# Patient Record
Sex: FEMALE | Race: OTHER
[De-identification: names, ages, dates, MRNs, and addresses within clinical notes are randomized per-mention and may not be internally consistent; named-entity substitution may affect disease eponyms.]

---

## 2017-04-20 ENCOUNTER — HOSPITAL ENCOUNTER (EMERGENCY)
Dept: HOSPITAL 63 - ER | Age: 27
Discharge: HOME | End: 2017-04-20
Payer: OTHER GOVERNMENT

## 2017-04-20 VITALS — BODY MASS INDEX: 21.97 KG/M2 | HEIGHT: 67 IN | WEIGHT: 140 LBS

## 2017-04-20 VITALS — SYSTOLIC BLOOD PRESSURE: 101 MMHG | DIASTOLIC BLOOD PRESSURE: 58 MMHG

## 2017-04-20 DIAGNOSIS — E87.6: ICD-10-CM

## 2017-04-20 DIAGNOSIS — Z86.2: ICD-10-CM

## 2017-04-20 DIAGNOSIS — K50.90: Primary | ICD-10-CM

## 2017-04-20 LAB
ALBUMIN SERPL-MCNC: 3.9 G/DL (ref 3.4–5)
ALBUMIN/GLOB SERPL: 1 {RATIO} (ref 1–1.7)
ALP SERPL-CCNC: 58 U/L (ref 46–116)
ALT SERPL-CCNC: 18 U/L (ref 14–59)
AMORPH SED URNS QL MICRO: PRESENT /HPF
AMPHETAMINE/METHAMPHETAMINE: (no result)
ANION GAP SERPL CALC-SCNC: 9 MMOL/L (ref 6–14)
APTT PPP: (no result) S
AST SERPL-CCNC: 19 U/L (ref 15–37)
BACTERIA #/AREA URNS HPF: (no result) /HPF
BARBITURATES UR-MCNC: (no result) UG/ML
BASOPHILS # BLD AUTO: 0.1 X10^3/UL (ref 0–0.2)
BASOPHILS NFR BLD: 1 % (ref 0–3)
BENZODIAZ UR-MCNC: (no result) UG/L
BILIRUB SERPL-MCNC: 0.3 MG/DL (ref 0.2–1)
BILIRUB UR QL STRIP: (no result)
BUN/CREAT SERPL: 12 (ref 6–20)
CA-I SERPL ISE-MCNC: 7 MG/DL (ref 7–20)
CALCIUM SERPL-MCNC: 8.9 MG/DL (ref 8.5–10.1)
CANNABINOIDS UR-MCNC: (no result) UG/L
CHLORIDE SERPL-SCNC: 103 MMOL/L (ref 98–107)
CO2 SERPL-SCNC: 29 MMOL/L (ref 21–32)
COCAINE UR-MCNC: (no result) NG/ML
CREAT SERPL-MCNC: 0.6 MG/DL (ref 0.6–1)
EOSINOPHIL NFR BLD: 0.2 X10^3/UL (ref 0–0.7)
EOSINOPHIL NFR BLD: 2 % (ref 0–3)
ERYTHROCYTE [DISTWIDTH] IN BLOOD BY AUTOMATED COUNT: 13.9 % (ref 11.5–14.5)
FIBRINOGEN PPP-MCNC: (no result) MG/DL
GFR SERPLBLD BASED ON 1.73 SQ M-ARVRAT: 119 ML/MIN
GLOBULIN SER-MCNC: 4 G/DL (ref 2.2–3.8)
GLUCOSE SERPL-MCNC: 83 MG/DL (ref 70–99)
GLUCOSE UR STRIP-MCNC: (no result) MG/DL
HCT VFR BLD CALC: 39.7 % (ref 36–47)
HGB BLD-MCNC: 12.7 G/DL (ref 12–15.5)
LIPASE: 63 U/L (ref 73–393)
LYMPHOCYTES # BLD: 1.8 X10^3/UL (ref 1–4.8)
LYMPHOCYTES NFR BLD AUTO: 24 % (ref 24–48)
MCH RBC QN AUTO: 27 PG (ref 25–35)
MCHC RBC AUTO-ENTMCNC: 32 G/DL (ref 31–37)
MCV RBC AUTO: 83 FL (ref 79–100)
METHADONE SERPL-MCNC: (no result) NG/ML
MONO #: 0.6 X10^3/UL (ref 0–1.1)
MONOCYTES NFR BLD: 8 % (ref 0–9)
NEUT #: 4.9 X10^3UL (ref 1.8–7.7)
NEUTROPHILS NFR BLD AUTO: 65 % (ref 31–73)
NITRITE UR QL STRIP: (no result)
OPIATES UR-MCNC: (no result) NG/ML
PCP SERPL-MCNC: (no result) MG/DL
PLATELET # BLD AUTO: 191 X10^3/UL (ref 140–400)
POTASSIUM SERPL-SCNC: 3.4 MMOL/L (ref 3.5–5.1)
PROT SERPL-MCNC: 7.9 G/DL (ref 6.4–8.2)
RBC # BLD AUTO: 4.76 X10^6/UL (ref 3.5–5.4)
RBC #/AREA URNS HPF: 0 /HPF (ref 0–2)
SODIUM SERPL-SCNC: 141 MMOL/L (ref 136–145)
SP GR UR STRIP: 1.02
SPERM #/AREA URNS HPF: PRESENT /HPF
SQUAMOUS #/AREA URNS LPF: (no result) /LPF
UROBILINOGEN UR-MCNC: 0.2 MG/DL
WBC # BLD AUTO: 7.5 X10^3/UL (ref 4–11)
WBC #/AREA URNS HPF: (no result) /HPF (ref 0–4)

## 2017-04-20 PROCEDURE — 96372 THER/PROPH/DIAG INJ SC/IM: CPT

## 2017-04-20 PROCEDURE — 80053 COMPREHEN METABOLIC PANEL: CPT

## 2017-04-20 PROCEDURE — 80305 DRUG TEST PRSMV DIR OPT OBS: CPT

## 2017-04-20 PROCEDURE — 83690 ASSAY OF LIPASE: CPT

## 2017-04-20 PROCEDURE — 36415 COLL VENOUS BLD VENIPUNCTURE: CPT

## 2017-04-20 PROCEDURE — 84703 CHORIONIC GONADOTROPIN ASSAY: CPT

## 2017-04-20 PROCEDURE — 74022 RADEX COMPL AQT ABD SERIES: CPT

## 2017-04-20 PROCEDURE — 96375 TX/PRO/DX INJ NEW DRUG ADDON: CPT

## 2017-04-20 PROCEDURE — 85027 COMPLETE CBC AUTOMATED: CPT

## 2017-04-20 PROCEDURE — 81025 URINE PREGNANCY TEST: CPT

## 2017-04-20 PROCEDURE — 96361 HYDRATE IV INFUSION ADD-ON: CPT

## 2017-04-20 PROCEDURE — 99285 EMERGENCY DEPT VISIT HI MDM: CPT

## 2017-04-20 PROCEDURE — 96374 THER/PROPH/DIAG INJ IV PUSH: CPT

## 2017-04-20 PROCEDURE — 81001 URINALYSIS AUTO W/SCOPE: CPT

## 2017-04-20 NOTE — RAD
Acute abdomen series with chest, 3 views, 4/20/2017:



History: Abdominal pain



Gas is present scattered throughout the colon in a nonspecific pattern. No

free air seen in the abdomen. There is no evidence of organomegaly or abnormal

abdominal calcification. Bilateral fallopian tube occlusion devices are noted.



The heart size is normal. The lungs are clear. There is no evidence of pleural

fluid.



IMPRESSION: No acute abdominal abnormality is detected.

## 2017-04-20 NOTE — PHYS DOC
General


Chief Complaint:  ABDOMINAL PAIN


Stated Complaint:  Crohn's exacerbation


Time Seen by MD:  10:31


Source:  patient


Exam Limitations:  no limitations


Problems:  





History of Present Illness


Initial Comments


Pt is 26/F h/o Crohn's to ED c/o abdominal pain.


Pt states she's having Crohn's exacerbation, hasn't had one in 3 years.  States 

that this am while working as  sudden onset abdominal pain.  She says 

pain was so strong it knocked her into the pool.  She dried off and drove 

herself to ED.


Abdominal pain low abdomen/epigastric 8.5 cramping identical to prior sx.  

Loose watery stool this am she thinks may have had slight blood.  Typically 

takes steroids/pain meds with her exacerbations.  No travel/bad food exposure, 

no fever/chills/cp/sob/n/v/HA/joint pain.





ED VS:  98.3, 75, 16, 114/70, 98% RA


Timing/Duration:  1-3 hours


Severity:  severe


Modifying Factors:  improves with medication


Associated Symptoms:  other


Allergies:  


Coded Allergies:  


     No Known Drug Allergies (Unverified , 17)





Past Medical History


Medical History:  other (Crohn/s, anemia, anxiety, bipolar, hashimoto's)


Surgical History:  other (Exp Lap)





Social History


Smoker:  non-smoker


Alcohol:  none


Drugs:  none





Review of Systems


Constitutional:  denies chills, denies fever


Respiratory:  denies cough, denies shortness of breath


Cardiovascular:  denies chest pain, denies palpitations


Gastrointestinal:   see HPI


Genitourinary:  denies dysuria, denies frequency


Musculoskeletal:  denies back pain, denies joint swelling, denies neck pain


Psychiatric/Neurological:  denies headache, denies numbness, denies paresthesia





Physical Exam


General Appearance:  WD/WN, mild distress


Eyes:  bilateral eye EOMI, bilateral eye PERRL, bilateral eye normal inspection


Ear, Nose, Throat:  hearing grossly normal, normal ENT inspection, normal 

pharynx


Neck:  non-tender, supple


Respiratory:  normal breath sounds, no respiratory distress


Cardiovascular:  normal peripheral pulses, regular rate, rhythm


Gastrointestinal:  normal bowel sounds, soft (ND, generalized TTP no focality 

no r/g/mass), no organomegaly


Rectal:  deferred


Back:  no CVA tenderness, no vertebral tenderness


Extremities:  non-tender, normal inspection


Neurologic/Psychiatric:  CNs II-XII nml as tested, no motor/sensory deficits, 

alert, normal mood/affect, oriented x 3


Skin:  normal color, warm/dry





Orders, Labs, Meds





PATIENT: FRANCESCA MCBRIDE ACCOUNT: [id number] MRN#: [id number]


: 1990 LOCATION: ER AGE: 26


SEX: F EXAM DT: 17 ACCESSION#: [id number]


STATUS: PRE ER ORD. PHYSICIAN: PRETTY MORA DO 


REASON: abd pain


PROCEDURE: ACUTE ABDOMEN SERIES











Acute abdomen series with chest, 3 views, 2017:





History: Abdominal pain





Gas is present scattered throughout the colon in a nonspecific pattern. No


free air seen in the abdomen. There is no evidence of organomegaly or abnormal


abdominal calcification. Bilateral fallopian tube occlusion devices are noted.





The heart size is normal. The lungs are clear. There is no evidence of pleural


fluid.





IMPRESSION: No acute abdominal abnormality is detected.














DICTATED AND SIGNED BY:     MORITZ,RICK S MD


DATE:     17 1139





CC: PRETTY MORA DO ~








K+ slightly low PO replacement given.  Pt feeling much better.  States she is 

ready for discharge, requests contact information for local GI as it has been 3 

years since her last GI evaluation/scopes.  Pt is aware she must obtain 

referral thru her PCP/radha.  She expressed agreement/understanding with 

treatment plan.


Departure


Time of Disposition:  12:22


Disposition:  01 HOME, SELF-CARE


Diagnosis:  Crohn's exacerbation, hypokalemia


Condition:  GOOD


Patient Instructions:  Crohn's Disease, Hypokalemia-Brief





Additional Instructions:


Work excuse thru  if needed.


Rest, no strenuous activity.


Aggressive hydration with gatorade, water.


Rx:  norco 5mg #15, prednisone, dicyclomine, cipro, flagyl


Take meds with food.


No alcohol while taking flagyl to avoid severe nausea and vomitting.





Follow up with your doctor Monday; may need outpatient GI referral.


Per your request I include contact information for our local gastroenterologist:


Bennett MEHTA MD


[facility]


[address]


[place] [address]





Eat one banana twice daily until doctor follow up.


Return to ED with new or changing symptoms.








PRETTY MORA DO 2017 11:48

## 2017-05-30 ENCOUNTER — HOSPITAL ENCOUNTER (EMERGENCY)
Dept: HOSPITAL 63 - ER | Age: 27
Discharge: HOME | End: 2017-05-30
Payer: OTHER GOVERNMENT

## 2017-05-30 VITALS — DIASTOLIC BLOOD PRESSURE: 92 MMHG | SYSTOLIC BLOOD PRESSURE: 134 MMHG

## 2017-05-30 DIAGNOSIS — R10.13: Primary | ICD-10-CM

## 2017-05-30 DIAGNOSIS — R11.2: ICD-10-CM

## 2017-05-30 DIAGNOSIS — E03.9: ICD-10-CM

## 2017-05-30 LAB
ALBUMIN SERPL-MCNC: 3.8 G/DL (ref 3.4–5)
ALP SERPL-CCNC: 59 U/L (ref 46–116)
ALT SERPL-CCNC: 12 U/L (ref 14–59)
ANION GAP SERPL CALC-SCNC: 9 MMOL/L (ref 6–14)
APTT PPP: (no result) S
AST SERPL-CCNC: 17 U/L (ref 15–37)
BACTERIA #/AREA URNS HPF: 0 /HPF
BASOPHILS # BLD AUTO: 0.1 X10^3/UL (ref 0–0.2)
BASOPHILS NFR BLD: 1 % (ref 0–3)
BILIRUB DIRECT SERPL-MCNC: 0.1 MG/DL (ref 0–0.2)
BILIRUB SERPL-MCNC: 0.3 MG/DL (ref 0.2–1)
BILIRUB UR QL STRIP: (no result)
CA-I SERPL ISE-MCNC: 8 MG/DL (ref 7–20)
CALCIUM SERPL-MCNC: 8.4 MG/DL (ref 8.5–10.1)
CHLORIDE SERPL-SCNC: 105 MMOL/L (ref 98–107)
CO2 SERPL-SCNC: 27 MMOL/L (ref 21–32)
CREAT SERPL-MCNC: 0.6 MG/DL (ref 0.6–1)
EOSINOPHIL NFR BLD: 0.2 X10^3/UL (ref 0–0.7)
EOSINOPHIL NFR BLD: 3 % (ref 0–3)
ERYTHROCYTE [DISTWIDTH] IN BLOOD BY AUTOMATED COUNT: 14.8 % (ref 11.5–14.5)
FIBRINOGEN PPP-MCNC: (no result) MG/DL
GFR SERPLBLD BASED ON 1.73 SQ M-ARVRAT: 119 ML/MIN
GLUCOSE SERPL-MCNC: 85 MG/DL (ref 70–99)
GLUCOSE UR STRIP-MCNC: (no result) MG/DL
HCT VFR BLD CALC: 37.5 % (ref 36–47)
HGB BLD-MCNC: 12.4 G/DL (ref 12–15.5)
HYALINE CASTS #/AREA URNS LPF: (no result) /HPF
LIPASE: 79 U/L (ref 73–393)
LYMPHOCYTES # BLD: 1.6 X10^3/UL (ref 1–4.8)
LYMPHOCYTES NFR BLD AUTO: 26 % (ref 24–48)
MCH RBC QN AUTO: 27 PG (ref 25–35)
MCHC RBC AUTO-ENTMCNC: 33 G/DL (ref 31–37)
MCV RBC AUTO: 81 FL (ref 79–100)
MONO #: 0.5 X10^3/UL (ref 0–1.1)
MONOCYTES NFR BLD: 8 % (ref 0–9)
NEUT #: 3.7 X10^3UL (ref 1.8–7.7)
NEUTROPHILS NFR BLD AUTO: 62 % (ref 31–73)
NITRITE UR QL STRIP: (no result)
PLATELET # BLD AUTO: 185 X10^3/UL (ref 140–400)
POTASSIUM SERPL-SCNC: 3.9 MMOL/L (ref 3.5–5.1)
PREG TEST PT QUAL: NEGATIVE
PROT SERPL-MCNC: 7.6 G/DL (ref 6.4–8.2)
RBC # BLD AUTO: 4.62 X10^6/UL (ref 3.5–5.4)
RBC #/AREA URNS HPF: (no result) /HPF (ref 0–2)
SODIUM SERPL-SCNC: 141 MMOL/L (ref 136–145)
SP GR UR STRIP: 1.02
SQUAMOUS #/AREA URNS LPF: (no result) /LPF
UROBILINOGEN UR-MCNC: 0.2 MG/DL
WBC # BLD AUTO: 6.1 X10^3/UL (ref 4–11)
WBC #/AREA URNS HPF: (no result) /HPF (ref 0–4)

## 2017-05-30 PROCEDURE — 96361 HYDRATE IV INFUSION ADD-ON: CPT

## 2017-05-30 PROCEDURE — 96376 TX/PRO/DX INJ SAME DRUG ADON: CPT

## 2017-05-30 PROCEDURE — 99285 EMERGENCY DEPT VISIT HI MDM: CPT

## 2017-05-30 PROCEDURE — 85027 COMPLETE CBC AUTOMATED: CPT

## 2017-05-30 PROCEDURE — 81001 URINALYSIS AUTO W/SCOPE: CPT

## 2017-05-30 PROCEDURE — 84703 CHORIONIC GONADOTROPIN ASSAY: CPT

## 2017-05-30 PROCEDURE — 96375 TX/PRO/DX INJ NEW DRUG ADDON: CPT

## 2017-05-30 PROCEDURE — 80048 BASIC METABOLIC PNL TOTAL CA: CPT

## 2017-05-30 PROCEDURE — 96374 THER/PROPH/DIAG INJ IV PUSH: CPT

## 2017-05-30 PROCEDURE — 80076 HEPATIC FUNCTION PANEL: CPT

## 2017-05-30 PROCEDURE — 83690 ASSAY OF LIPASE: CPT

## 2017-05-30 PROCEDURE — 36415 COLL VENOUS BLD VENIPUNCTURE: CPT

## 2017-05-30 PROCEDURE — 81025 URINE PREGNANCY TEST: CPT

## 2017-05-30 NOTE — PHYS DOC
Past History


Past Medical History:  Hypothyroid, Other


Past Surgical History:  Other


Alcohol Use:  None


Drug Use:  None





Adult General


Chief Complaint


Chief Complaint:  ABDOMINAL PAIN





HPI


HPI





26-year-old female presenting to the emergency department with epigastric 

abdominal pain that radiates bilaterally. She reports blood in her stools and 

history of Crohn's disease. She reports this is similar to many of her Crohn's 

flares. The pain is mild intermittent and without alleviating factors. She 

called her primary care doctor who recommended she come here for evaluation and 

care.





Review of systems is negative for chest pain shortness of breath. Positive for 

nausea with one episode of nonbilious nonbloody emesis. Negative for fevers 

chills or rashes. All other review of systems is negative unless otherwise 

noted in history of present illness.





Review of Systems


Review of Systems


SEE ABOVE.





Current Medications


Current Medications





Current Medications








 Medications


  (Trade)  Dose


 Ordered  Sig/Sandy  Start Time


 Stop Time Status Last Admin


Dose Admin


 


 Hydromorphone HCl


  (Dilaudid)  0.5 mg  1X  ONCE  5/30/17 08:00


 5/30/17 08:01   


 


 


 Ondansetron HCl


  (Zofran)  4 mg  1X  ONCE  5/30/17 07:30


 5/30/17 07:31 UNV  


 


 


 Sodium Chloride  1,000 ml @ 


 1,000 mls/hr  1X  ONCE  5/30/17 07:30


 5/30/17 08:29 UNV  


 











Allergies


Allergies





Allergies








Coded Allergies Type Severity Reaction Last Updated Verified


 


  No Known Drug Allergies    4/20/17 No











Physical Exam


Physical Exam





Constitutional: Well developed, well nourished, no acute distress, non-toxic 

appearance. 


HENT: Normocephalic, atraumatic, bilateral external ears normal, oropharynx 

moist, no oral exudates, nose normal. 


Eyes: PERRLA, EOMI, conjunctiva normal, no discharge. [] 


Neck: Normal range of motion, no tenderness, supple, no stridor. [] 


Cardiovascular:Heart rate regular rhythm, no murmur 


Lungs & Thorax:  Bilateral breath sounds clear to auscultation []


Abdomen: Soft mildly tender epigastrum without rebound tenderness or guarding 

present. Negative McBurneys point. Negative Martin sign. No ecchymosis present. 


Skin: Warm, dry, no erythema, no rash. [] 


Back: No tenderness, no CVA tenderness. 


Extremities: No tenderness, no cyanosis, no clubbing, ROM intact, no edema.  


Neurologic: Alert and oriented X 3, normal motor function, normal sensory 

function, no focal deficits noted. []


Psychologic: Affect normal, judgement normal, mood normal.





Current Patient Data


Vital Signs





 Vital Signs








  Date Time  Temp Pulse Resp B/P (MAP) Pulse Ox O2 Delivery O2 Flow Rate FiO2


 


5/30/17 06:50 97.8 66 18  97 Room Air  











EKG


EKG


[]





Radiology/Procedures


Radiology/Procedures


[]





Course & Med Decision Making


Course & Med Decision Making


Pertinent Labs and Imaging studies reviewed. (See chart for details)





[] 18-year-old female presenting to the emergency department today with 

epigastric abdominal pain. Vital signs showed her to be afebrile. Normal heart 

rate. Saturating well. Pertinent physical exam findings showed mild tenderness 

in the epigastrium without rebound tenderness or guarding. Nontender 

gallbladder appendix. The patient was given IV fluids nausea and pain 

medication. Blood work sent. On reexamination the patient's abdomen was 

nontender, she is feeling better and subsequently discharged home. The patient 

was then discharged home in stable condition to follow up with their primary 

care physician over the next 2-3 days. They were to return if their symptoms 

worsened or if they were concerned for any reason. Face-to-face discharge 

instructions and return precautions were given. Patient's questions were 

answered to their satisfaction. Patient is comfortable plan.





Dragon Disclaimer


Dragon Disclaimer


This chart was dictated in whole or in part using Voice Recognition software in 

a busy, high-work load, and often noisy Emergency Department environment.  It 

may contain unintended and wholly unrecognized errors or omissions.





Departure


Departure:


Impression:  


 Primary Impression:  


 Epigastric abdominal pain


 Additional Impression:  


 Nausea and vomiting


Disposition:  01 HOME, SELF-CARE


Condition:  STABLE


Referrals:  


KIRBY BELTRÁN DO, MPH (PCP)


Patient Instructions:  Abdominal Pain, Nausea and Vomiting





Additional Instructions:  


Thank you for allowing us to participate in your care today.





Followup with your primary care physician in 3 days if your symptoms do not 

improve. If you do not have a primary care provider you can ask for a list of 

our primary care providers. Return to the emergency department you have any new 

or concerning findings.





This should be evaluated by the primary care physician and any necessary 

consulting services for continued management within a few days after discharge. 

Return to emergency room if you have any  new or concerning symptoms including 

but not limited to fever, chills, nausea, vomiting, intractable pain, any new 

rashes, chest pain, shortness of air, uncontrolled bleeding, difficulty 

breathing, and/or vision loss.


Scripts


Ondansetron Hcl (ZOFRAN) 4 Mg Tablet


1 TAB PO PRN Q6HRS Y for NAUSEA, #6 TAB


   Prov: ZAYDA GUPTA MD         5/30/17 


Morphine Sulfate (MORPHINE SULFATE) 15 Mg Tablet


1 TAB PO PRN Q8HRS Y for SEVERE PAIN, #8 TAB


   Be careful as this medication may make you sleepy or


   drowsy. Do not drive or operate heavy machinery on this


   medication. Be sure to ask the pharmacist about other side


   effects that can exist such as constipation.


   Prov: ZAYDA GUPTA MD         5/30/17





Problem Qualifiers








 Additional Impression:  


 Nausea and vomiting


 Vomiting type:  unspecified  Vomiting Intractability:  non-intractable  

Qualified Codes:  R11.2 - Nausea with vomiting, unspecified








ZAYDA GUPTA MD May 30, 2017 07:39

## 2017-08-29 ENCOUNTER — HOSPITAL ENCOUNTER (EMERGENCY)
Dept: HOSPITAL 63 - ER | Age: 27
Discharge: HOME | End: 2017-08-29
Payer: OTHER GOVERNMENT

## 2017-08-29 VITALS — BODY MASS INDEX: 22.44 KG/M2 | HEIGHT: 67 IN | WEIGHT: 143 LBS

## 2017-08-29 VITALS — SYSTOLIC BLOOD PRESSURE: 110 MMHG | DIASTOLIC BLOOD PRESSURE: 59 MMHG
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
## 2017-08-29 DIAGNOSIS — E03.9: ICD-10-CM

## 2017-08-29 DIAGNOSIS — S39.012A: Primary | ICD-10-CM
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
## 2017-08-29 DIAGNOSIS — K50.90: ICD-10-CM
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
## 2017-08-29 LAB
APTT PPP: YELLOW S
BACTERIA #/AREA URNS HPF: 0 /HPF
BILIRUB UR QL STRIP: (no result)
FIBRINOGEN PPP-MCNC: CLEAR MG/DL
GLUCOSE UR STRIP-MCNC: (no result) MG/DL
NITRITE UR QL STRIP: (no result)
RBC #/AREA URNS HPF: 0 /HPF (ref 0–2)
SP GR UR STRIP: 1.02
SQUAMOUS #/AREA URNS LPF: (no result) /LPF
UROBILINOGEN UR-MCNC: 0.2 MG/DL
WBC #/AREA URNS HPF: (no result) /HPF (ref 0–4)

## 2017-08-29 PROCEDURE — 81001 URINALYSIS AUTO W/SCOPE: CPT

## 2017-08-29 PROCEDURE — 96372 THER/PROPH/DIAG INJ SC/IM: CPT

## 2017-08-29 PROCEDURE — 99283 EMERGENCY DEPT VISIT LOW MDM: CPT

## 2017-08-29 RX ADMIN — KETOROLAC TROMETHAMINE ONE MG: 30 INJECTION, SOLUTION INTRAMUSCULAR at 22:05

## 2017-08-29 NOTE — PHYS DOC
Past History


Past Medical History:  Hypothyroid, Other


Past Surgical History:  Other


Alcohol Use:  None


Drug Use:  None





Adult General


Chief Complaint


Chief Complaint:  FLANK PAIN





\[facility] [place] [facility]\""


HPI





26-year-old female with history of Crohn's disease previously on multiple 

narcotics, now presents to the emergency department complaining of left low 

back pain. Patient reports normal bowel and bladder habits. She states her left 

low back is very sore with movement. No nausea vomiting fevers chills or 

sweats.It Does not radiate into her buttock or legs. She is able to use her 

legs normally. Patient cannot identify any injury which might of strained her 

back. Does not report any strenuous activity falls or trauma. Patient has tubal 

ligation in place and denies the possibility of pregnancy





Review of Systems


Review of Systems





Constitutional: Denies fever or chills []


Eyes: Denies change in visual acuity, redness, or eye pain []


HENT: Denies nasal congestion or sore throat []


Respiratory: Denies cough or shortness of breath []


Cardiovascular: No additional information not addressed in HPI []


GI: Denies abdominal pain, nausea, vomiting, bloody stools or diarrhea []


: Denies dysuria or hematuria []


Musculoskeletal: Denies back pain or joint pain []


Integument: Denies rash or skin lesions []


Neurologic: Denies headache, focal weakness or sensory changes []


Endocrine: Denies polyuria or polydipsia []





Allergies


Allergies





Allergies








Coded Allergies Type Severity Reaction Last Updated Verified


 


  No Known Drug Allergies    4/20/17 No











Physical Exam


Physical Exam


Well-appearing female no acute distress clear lungs regular rate and rhythm. 

Left lumbar distribution with soft tissue tenderness laterally. No CVA 

tenderness. No spinal tenderness no skin changes. Nontender abdomen and pelvis. 

Normal bowel sounds no mass or megaly nondistended abdomen. Remainder of exam 

is benign including neurovascularly intact bilateral lower extremities with no 

saddle anesthesia negative straight leg raise normal symmetrical reflexes.


Constitutional: Well developed, well nourished, no acute distress, non-toxic 

appearance. []


HENT: Normocephalic, atraumatic, bilateral external ears normal, oropharynx 

moist, no oral exudates, nose normal. []


Eyes: PERRLA, EOMI, conjunctiva normal, no discharge. [] 


Neck: Normal range of motion, no tenderness, supple, no stridor. [] 


Cardiovascular:Heart rate regular rhythm, no murmur []


Lungs & Thorax:  Bilateral breath sounds clear to auscultation []


Abdomen: Bowel sounds normal, soft, no tenderness, no masses, no pulsatile 

masses. [] 


Skin: Warm, dry, no erythema, no rash. [] 


Back: No tenderness, no CVA tenderness. [] 


Extremities: No tenderness, no cyanosis, no clubbing, ROM intact, no edema. [] 


Neurologic: Alert and oriented X 3,  no focal deficits noted. []


Psychologic: Affect normal, judgement normal, mood normal. []





Current Patient Data


Vital Signs





 Vital Signs








  Date Time  Temp Pulse Resp B/P (MAP) Pulse Ox O2 Delivery O2 Flow Rate FiO2


 


8/29/17 20:56 98.0 68 20  98 Room Air  











EKG


EKG


[]





Radiology/Procedures


Radiology/Procedures


[]





Course & Med Decision Making


Course & Med Decision Making


Pertinent Labs and Imaging studies reviewed. (See chart for details)





Signs and symptoms consistent with musculoskeletal low back pain likely 

secondary to lumbar strain. Patient appears to have a baseline of anger without 

basis. No evidence of cauda equina syndrome. Urinalysis pending to rule out 

less likely possibility of pyelonephritis. Presentation not consistent with 

renal colic. She has previously been on multiple narcotics by her description 

for her Crohn's disease. She is very clear that this is not consistent with an 

exacerbation of her Crohn's disease as she has no anterior abdominal pain. No 

vomiting or diarrhea. She reports she is not on narcotics currently. We'll give 

Toradol IM and anticipate outpatient follow-up with NSAIDs and Tylenol. Patient 

were to follow up with PCP tomorrow for reevaluation and further workup and 

treatment as needed. She agrees with outpatient follow-up and strict return 

precautions given.


[]





Dragon Disclaimer


Dragon Disclaimer


This chart was dictated in whole or in part using Voice Recognition software in 

a busy, high-work load, and often noisy Emergency Department environment.  It 

may contain unintended and wholly unrecognized errors or omissions.





Departure


Departure:


Impression:  


 Primary Impression:  


 Low back pain


 Additional Impression:  


 Lumbar strain


Disposition:  01 HOME, SELF-CARE


Condition:  GOOD


Referrals:  


JENNI MARINO MD (PCP)


Patient Instructions:  Back Pain, Adult





Additional Instructions:  


You have musculoskeletal low back pain. Rest and apply ice if you feel this is 

helpful. Avoid strenuous activity until you're feeling better. Take 800 mg of 

ibuprofen every 6 hours as well as Tylenol every 4 hours if necessary. Follow-

up with your doctor in 1-2 days and return immediately for new severe or 

worsening symptoms.





Problem Qualifiers











AMAIRANI JAMISON MD Aug 29, 2017 21:43

## 2017-09-11 ENCOUNTER — HOSPITAL ENCOUNTER (EMERGENCY)
Dept: HOSPITAL 63 - ER | Age: 27
Discharge: HOME | End: 2017-09-11
Payer: OTHER GOVERNMENT

## 2017-09-11 VITALS — SYSTOLIC BLOOD PRESSURE: 107 MMHG | DIASTOLIC BLOOD PRESSURE: 63 MMHG

## 2017-09-11 DIAGNOSIS — J02.0: Primary | ICD-10-CM

## 2017-09-11 DIAGNOSIS — E03.9: ICD-10-CM

## 2017-09-11 PROCEDURE — 99283 EMERGENCY DEPT VISIT LOW MDM: CPT

## 2017-09-12 NOTE — ED.ADGEN
Past History


Past Medical History:  Hypothyroid, Other


Past Surgical History:  No Surgical History, Other


Alcohol Use:  None


Drug Use:  None





Adult General


Chief Complaint


Chief Complaint


sore throat, body aches





[facility]





Patient is a a female presents with sore throat body aches today. No fever 

chills or sweats. Pain with swallowing pedis history of strep throat and strep 

throat exposure. No rash neck stiffness.





Review of Systems


Review of Systems





Constitutional: Denies fever or chills []


Eyes: Denies change in visual acuity, redness, or eye pain [] []


Respiratory: Denies cough or shortness of breath []


Cardiovascular: No additional information not addressed in HPI []


GI: Denies abdominal pain, nausea, vomiting, bloody stools or diarrhea []


: Denies dysuria or hematuria []


Musculoskeletal: Denies back pain or joint pain []


Integument: Denies rash or skin lesions []


Neurologic: Denies headache, focal weakness or sensory changes []


Endocrine: Denies polyuria or polydipsia []





Allergies


Allergies





Allergies








Coded Allergies Type Severity Reaction Last Updated Verified


 


  No Known Drug Allergies    4/20/17 No











Physical Exam


Physical Exam





Constitutional: Well developed, well nourished, no acute distress, non-toxic 

appearance. []


HENT: Normocephalic, atraumatic, bilateral external ears normal, oropharynx 

moist, red mild swelling, exudate present, nose normal. []


Eyes: PERRLA, EOMI, conjunctiva normal, no discharge. [] 


Neck: Normal range of motion, no tenderness, supple, no stridor. [] 


Cardiovascular:Heart rate regular rhythm, no murmur []


Lungs & Thorax:  Bilateral breath sounds clear to auscultation []


Abdomen: Bowel sounds normal, soft, no tenderness, no masses, no pulsatile 

masses. [] 


Skin: Warm, dry, no erythema, no rash. [] 


Back: No tenderness, no CVA tenderness. [] 


Extremities: No tenderness, no cyanosis, no clubbing, ROM intact, no edema. [] 


Neurologic: Alert and oriented X 3, normal motor function, normal sensory 

function, no focal deficits noted. []


Psychologic: Affect normal, judgement normal, mood normal. []





Current Patient Data


Vital Signs





 Vital Signs








  Date Time  Temp Pulse Resp B/P (MAP) Pulse Ox O2 Delivery O2 Flow Rate FiO2


 


9/11/17 17:40 98.9 78 18  99 Room Air  











EKG


EKG


[]





Radiology/Procedures


Radiology/Procedures


[]





Course & Med Decision Making


Course & Med Decision Making


Pertinent Labs and Imaging studies reviewed. (See chart for details)





[clinical strep pharyngitis]





Final Impression


Final Impression


strep pharyngitis]


Problems:  





Dragon Disclaimer


Dragon Disclaimer


This electronic medical record was generated, in whole or in part, using a 

voice recognition dictation system.











EDUARD CRUZ DO Sep 12, 2017 05:36

## 2017-10-11 ENCOUNTER — HOSPITAL ENCOUNTER (EMERGENCY)
Dept: HOSPITAL 63 - ER | Age: 27
Discharge: HOME | End: 2017-10-11
Payer: OTHER GOVERNMENT

## 2017-10-11 VITALS — DIASTOLIC BLOOD PRESSURE: 50 MMHG | SYSTOLIC BLOOD PRESSURE: 102 MMHG

## 2017-10-11 VITALS — WEIGHT: 143 LBS | HEIGHT: 67 IN | BODY MASS INDEX: 22.44 KG/M2

## 2017-10-11 DIAGNOSIS — J98.8: Primary | ICD-10-CM

## 2017-10-11 DIAGNOSIS — E03.9: ICD-10-CM

## 2017-10-11 LAB
ANION GAP SERPL CALC-SCNC: 5 MMOL/L (ref 6–14)
BASOPHILS # BLD AUTO: 0.1 X10^3/UL (ref 0–0.2)
BASOPHILS NFR BLD: 1 % (ref 0–3)
CA-I SERPL ISE-MCNC: 7 MG/DL (ref 7–20)
CALCIUM SERPL-MCNC: 8.5 MG/DL (ref 8.5–10.1)
CHLORIDE SERPL-SCNC: 106 MMOL/L (ref 98–107)
CO2 SERPL-SCNC: 29 MMOL/L (ref 21–32)
CREAT SERPL-MCNC: 0.6 MG/DL (ref 0.6–1)
EOSINOPHIL NFR BLD: 0.4 X10^3/UL (ref 0–0.7)
EOSINOPHIL NFR BLD: 4 % (ref 0–3)
ERYTHROCYTE [DISTWIDTH] IN BLOOD BY AUTOMATED COUNT: 14.3 % (ref 11.5–14.5)
GFR SERPLBLD BASED ON 1.73 SQ M-ARVRAT: 118.2 ML/MIN
GLUCOSE SERPL-MCNC: 96 MG/DL (ref 70–99)
HCT VFR BLD CALC: 38.1 % (ref 36–47)
HGB BLD-MCNC: 12.6 G/DL (ref 12–15.5)
LYMPHOCYTES # BLD: 3.2 X10^3/UL (ref 1–4.8)
LYMPHOCYTES NFR BLD AUTO: 33 % (ref 24–48)
MCH RBC QN AUTO: 27 PG (ref 25–35)
MCHC RBC AUTO-ENTMCNC: 33 G/DL (ref 31–37)
MCV RBC AUTO: 82 FL (ref 79–100)
MONO #: 0.8 X10^3/UL (ref 0–1.1)
MONOCYTES NFR BLD: 8 % (ref 0–9)
NEUT #: 5.2 X10^3UL (ref 1.8–7.7)
NEUTROPHILS NFR BLD AUTO: 54 % (ref 31–73)
PLATELET # BLD AUTO: 223 X10^3/UL (ref 140–400)
POTASSIUM SERPL-SCNC: 3.8 MMOL/L (ref 3.5–5.1)
RBC # BLD AUTO: 4.65 X10^6/UL (ref 3.5–5.4)
SODIUM SERPL-SCNC: 140 MMOL/L (ref 136–145)
WBC # BLD AUTO: 9.7 X10^3/UL (ref 4–11)

## 2017-10-11 PROCEDURE — 96374 THER/PROPH/DIAG INJ IV PUSH: CPT

## 2017-10-11 PROCEDURE — 99285 EMERGENCY DEPT VISIT HI MDM: CPT

## 2017-10-11 PROCEDURE — 80048 BASIC METABOLIC PNL TOTAL CA: CPT

## 2017-10-11 PROCEDURE — 36415 COLL VENOUS BLD VENIPUNCTURE: CPT

## 2017-10-11 PROCEDURE — 70491 CT SOFT TISSUE NECK W/DYE: CPT

## 2017-10-11 PROCEDURE — 85025 COMPLETE CBC W/AUTO DIFF WBC: CPT

## 2017-10-11 PROCEDURE — 96361 HYDRATE IV INFUSION ADD-ON: CPT

## 2017-10-11 NOTE — PHYS DOC
Past History


Past Medical History:  Hypothyroid, Other


Past Surgical History:  No Surgical History, Other


Alcohol Use:  None


Drug Use:  None





Adult General


Chief Complaint


Chief Complaint:  SORE THROAT





HPI


HPI





Patient is a 27 year old F who presents with sore throat and difficulty 

swallowing over the past 2 days. She feels that her symptoms are gradually 

worsening. She denies cough. She denies fevers sweats chills or flulike 

symptoms. She was treated for strep throat approximately 2-3 weeks ago and 

feels that her symptoms did resolve.





Review of Systems


Review of Systems





Constitutional: Denies fever or chills []


Eyes: Denies change in visual acuity, redness, or eye pain []


HENT: Denies nasal congestion 


Respiratory: Denies cough or shortness of breath []


Cardiovascular: No additional information not addressed in HPI []


GI: Denies abdominal pain, nausea, vomiting, bloody stools or diarrhea []


: Denies dysuria or hematuria []


Musculoskeletal: Denies back pain or joint pain []


Integument: Denies rash or skin lesions []


Neurologic: Denies headache, focal weakness or sensory changes []


Endocrine: Denies polyuria or polydipsia []





Family History


Family History


Noncontributory





Current Medications


Current Medications


No current home medications


Current Medications








 Medications


  (Trade)  Dose


 Ordered  Sig/Sandy  Start Time


 Stop Time Status Last Admin


Dose Admin


 


 Iohexol


  (Omnipaque 300


 Mg/ml)  75 ml  1X  ONCE  10/11/17 20:30


 10/11/17 20:31 DC 10/11/17 20:23


75 ML


 


 Sodium Chloride  1,000 ml @ 


 1,000 mls/hr  1X  ONCE  10/11/17 20:00


 10/11/17 20:59 DC 10/11/17 20:05


1,000 MLS/HR











Allergies


Allergies





Allergies








Coded Allergies Type Severity Reaction Last Updated Verified


 


  No Known Drug Allergies    4/20/17 No











Physical Exam


Physical Exam





Constitutional: Well developed, well nourished, no acute distress, non-toxic 

appearance. []


HENT: Normocephalic, atraumatic, bilateral external ears normal, oropharynx 

moist, no oral exudates, nose normal. [] Mild tenderness with palpation of the 

anterior neck


Eyes: PERRLA, conjunctiva normal, no discharge. [] 


Neck: Normal range of motion, no tenderness, supple, no stridor. [] 


Cardiovascular:Heart rate regular rhythm, no murmur []


Lungs & Thorax:  Bilateral breath sounds clear to auscultation []


Abdomen: Bowel sounds normal, soft, no tenderness, no masses, no pulsatile 

masses. [] 


Skin: Warm, dry, no erythema, no rash. [] 


Back: No tenderness, no CVA tenderness. [] 


Extremities: No tenderness, no cyanosis, no clubbing, ROM intact, no edema. [] 


Neurologic: Alert and oriented X 3, normal motor function, normal sensory 

function, no focal deficits noted. []


Psychologic: Affect normal, judgement normal, mood normal. []





Current Patient Data


Vital Signs





 Vital Signs








  Date Time  Temp Pulse Resp B/P (MAP) Pulse Ox O2 Delivery O2 Flow Rate FiO2


 


10/11/17 18:20 98.7 80 18  99 Room Air  








Lab Results





 Laboratory Tests








Test


  10/11/17


19:55


 


White Blood Count


  9.7 x10^3/uL


(4.0-11.0)


 


Red Blood Count


  4.65 x10^6/uL


(3.50-5.40)


 


Hemoglobin


  12.6 g/dL


(12.0-15.5)


 


Hematocrit


  38.1 %


(36.0-47.0)


 


Mean Corpuscular Volume


  82 fL ()


 


 


Mean Corpuscular Hemoglobin 27 pg (25-35)  


 


Mean Corpuscular Hemoglobin


Concent 33 g/dL


(31-37)


 


Red Cell Distribution Width


  14.3 %


(11.5-14.5)


 


Platelet Count


  223 x10^3/uL


(140-400)


 


Neutrophils (%) (Auto) 54 % (31-73)  


 


Lymphocytes (%) (Auto) 33 % (24-48)  


 


Monocytes (%) (Auto) 8 % (0-9)  


 


Eosinophils (%) (Auto) 4 % (0-3)  H


 


Basophils (%) (Auto) 1 % (0-3)  


 


Neutrophils # (Auto)


  5.2 x10^3uL


(1.8-7.7)


 


Lymphocytes # (Auto)


  3.2 x10^3/uL


(1.0-4.8)


 


Monocytes # (Auto)


  0.8 x10^3/uL


(0.0-1.1)


 


Eosinophils # (Auto)


  0.4 x10^3/uL


(0.0-0.7)


 


Basophils # (Auto)


  0.1 x10^3/uL


(0.0-0.2)


 


Sodium Level


  140 mmol/L


(136-145)


 


Potassium Level


  3.8 mmol/L


(3.5-5.1)


 


Chloride Level


  106 mmol/L


()


 


Carbon Dioxide Level


  29 mmol/L


(21-32)


 


Anion Gap 5 (6-14)  L


 


Blood Urea Nitrogen


  7 mg/dL (7-20)


 


 


Creatinine


  0.6 mg/dL


(0.6-1.0)


 


Estimated GFR


(Cockcroft-Gault) 118.2  


 


 


Glucose Level


  96 mg/dL


(70-99)


 


Calcium Level


  8.5 mg/dL


(8.5-10.1)











EKG


EKG


[]





Radiology/Procedures


Radiology/Procedures


CT soft tissue neck with contrast


Impressions:


Lymphadenopathy otherwise no acute disease





Course & Med Decision Making


Course & Med Decision Making


Pertinent Labs and Imaging studies reviewed. (See chart for details)





She was given IV fluids and IV steroids during her stay.





The possibility of strep throat or mono was discussed with Marbin.  She 

declined screening for these infections. Her symptoms are most consistent with 

mono or some other viral infection causing lymphadenopathy. Risks associated 

with mono including splenic rupture were discussed. She was advised to avoid 

contact sports despite no physical findings of splenomegaly or left upper 

quadrant tenderness. She was started on steroids and was given a prescription 

for antibiotics to start if she develops fever or other symptoms of strep 

throat. She was strongly encouraged follow-up with her primary care doctor for 

further management and to return to the emergency room if she develops new or 

worsening symptoms





Dragon Disclaimer


Dragon Disclaimer


This chart was dictated in whole or in part using Voice Recognition software in 

a busy, high-work load, and often noisy Emergency Department environment.  It 

may contain unintended and wholly unrecognized errors or omissions.





Departure


Departure:


Impression:  


 Primary Impression:  


 Viral respiratory infection


Disposition:  01 HOME, SELF-CARE


Condition:  STABLE


Referrals:  


JENNI MARINO MD (PCP)


Patient Instructions:  Viral Syndrome





Additional Instructions:  


Marbin was seen in the emergency department for difficulty swallowing. No 

emergency medical condition was found on history or physical exam. She did have 

normal labs and imaging. Her symptoms are most consistent with a viral 

infection for which she was started on a steroid to help with swelling. She was 

also given a prescription for antibiotics to start if she develops fever or 

flulike symptoms. She was advised follow-up with her primary care doctor in the 

next 3-5 days for further management. She is also advised to return to the 

emergency room if she develops new or worsening symptoms.


Scripts


Azithromycin (AZITHROMYCIN PACKET) 1 Gm Packet


1 PACKET PO ONCE, #1 PACKET


   Prov: KARLY RUSSELL MD         10/11/17 


Prednisone (PREDNISONE) 10 Mg Tablet


10 MG PO DAILY for 5 Days, #5 TAB


   Prov: KARLY RUSSELL MD         10/11/17











KARLY RUSSELL MD Oct 11, 2017 21:36

## 2017-10-11 NOTE — RAD
CT scan of the neck with contrast 10/11/2017

 

 HISTORY: Difficulty swallowing with neck pain for one week. 

 

TECHNIQUE: After the intravenous administration of 75 cc of Omnipaque 300,

contiguous, 3 mm axial sections were obtained through the neck.

 

One or more of the following individualized dose reduction techniques were

utilized for this study:

 

1. Automated exposure control.

2. Adjustment of the mA and/or kV according to patient size.

3. Use of iterative reconstruction technique.

 

FINDINGS: The mucosal structures of the nasopharynx, oropharynx, 

hypopharynx and larynx are within normal limits. The parotid and 

submandibular glands are within normal limits. The thyroid gland is within

normal limits. No abnormal soft tissue mass or fluid collection is seen 

within the neck. Prominent likely reactive lymph nodes are seen scattered 

throughout the neck. These measure 5 mm to 1.7 cm in size. The osseous 

structures are grossly intact.

 

IMPRESSION: No acute abnormality is seen.

 

 

 

Electronically signed by: Dayton Hernandez MD (10/11/2017 8:55 PM) [facility]

## 2017-11-26 ENCOUNTER — HOSPITAL ENCOUNTER (EMERGENCY)
Dept: HOSPITAL 63 - ER | Age: 27
Discharge: HOME | End: 2017-11-26
Payer: OTHER GOVERNMENT

## 2017-11-26 VITALS — HEIGHT: 67 IN | WEIGHT: 145 LBS | BODY MASS INDEX: 22.76 KG/M2

## 2017-11-26 VITALS — DIASTOLIC BLOOD PRESSURE: 58 MMHG | SYSTOLIC BLOOD PRESSURE: 98 MMHG

## 2017-11-26 DIAGNOSIS — N83.202: Primary | ICD-10-CM

## 2017-11-26 DIAGNOSIS — J45.909: ICD-10-CM

## 2017-11-26 DIAGNOSIS — Z88.1: ICD-10-CM

## 2017-11-26 DIAGNOSIS — K50.90: ICD-10-CM

## 2017-11-26 LAB
ALBUMIN SERPL-MCNC: 3.3 G/DL (ref 3.4–5)
ALBUMIN/GLOB SERPL: 0.9 {RATIO} (ref 1–1.7)
ALP SERPL-CCNC: 56 U/L (ref 46–116)
ALT SERPL-CCNC: 18 U/L (ref 14–59)
ANION GAP SERPL CALC-SCNC: 8 MMOL/L (ref 6–14)
APTT PPP: YELLOW S
AST SERPL-CCNC: 22 U/L (ref 15–37)
BACTERIA #/AREA URNS HPF: (no result) /HPF
BASOPHILS # BLD AUTO: 0.1 X10^3/UL (ref 0–0.2)
BASOPHILS NFR BLD: 1 % (ref 0–3)
BILIRUB SERPL-MCNC: 0.1 MG/DL (ref 0.2–1)
BILIRUB UR QL STRIP: (no result)
BUN/CREAT SERPL: 8 (ref 6–20)
CA-I SERPL ISE-MCNC: 4 MG/DL (ref 7–20)
CALCIUM SERPL-MCNC: 8.2 MG/DL (ref 8.5–10.1)
CHLORIDE SERPL-SCNC: 107 MMOL/L (ref 98–107)
CO2 SERPL-SCNC: 27 MMOL/L (ref 21–32)
CREAT SERPL-MCNC: 0.5 MG/DL (ref 0.6–1)
CRP SERPL-MCNC: 2.8 MG/L (ref 0–3.3)
EOSINOPHIL NFR BLD: 0.2 X10^3/UL (ref 0–0.7)
EOSINOPHIL NFR BLD: 4 % (ref 0–3)
ERYTHROCYTE [DISTWIDTH] IN BLOOD BY AUTOMATED COUNT: 13.8 % (ref 11.5–14.5)
FIBRINOGEN PPP-MCNC: CLEAR MG/DL
GFR SERPLBLD BASED ON 1.73 SQ M-ARVRAT: 145.9 ML/MIN
GLOBULIN SER-MCNC: 3.8 G/DL (ref 2.2–3.8)
GLUCOSE SERPL-MCNC: 86 MG/DL (ref 70–99)
GLUCOSE UR STRIP-MCNC: (no result) MG/DL
HCT VFR BLD CALC: 36.8 % (ref 36–47)
HGB BLD-MCNC: 12.3 G/DL (ref 12–15.5)
LYMPHOCYTES # BLD: 1 X10^3/UL (ref 1–4.8)
LYMPHOCYTES NFR BLD AUTO: 19 % (ref 24–48)
MCH RBC QN AUTO: 27 PG (ref 25–35)
MCHC RBC AUTO-ENTMCNC: 33 G/DL (ref 31–37)
MCV RBC AUTO: 82 FL (ref 79–100)
MONO #: 0.8 X10^3/UL (ref 0–1.1)
MONOCYTES NFR BLD: 15 % (ref 0–9)
NEUT #: 3.3 X10^3UL (ref 1.8–7.7)
NEUTROPHILS NFR BLD AUTO: 61 % (ref 31–73)
NITRITE UR QL STRIP: (no result)
PLATELET # BLD AUTO: 188 X10^3/UL (ref 140–400)
POTASSIUM SERPL-SCNC: 3.7 MMOL/L (ref 3.5–5.1)
PREG TEST PT QUAL: NEGATIVE
PROT SERPL-MCNC: 7.1 G/DL (ref 6.4–8.2)
RBC # BLD AUTO: 4.5 X10^6/UL (ref 3.5–5.4)
RBC #/AREA URNS HPF: (no result) /HPF (ref 0–2)
SODIUM SERPL-SCNC: 142 MMOL/L (ref 136–145)
SP GR UR STRIP: 1.02
SQUAMOUS #/AREA URNS LPF: (no result) /LPF
UROBILINOGEN UR-MCNC: 0.2 MG/DL
WBC # BLD AUTO: 5.4 X10^3/UL (ref 4–11)
WBC #/AREA URNS HPF: (no result) /HPF (ref 0–4)

## 2017-11-26 PROCEDURE — 86140 C-REACTIVE PROTEIN: CPT

## 2017-11-26 PROCEDURE — 81001 URINALYSIS AUTO W/SCOPE: CPT

## 2017-11-26 PROCEDURE — 85025 COMPLETE CBC W/AUTO DIFF WBC: CPT

## 2017-11-26 PROCEDURE — 76856 US EXAM PELVIC COMPLETE: CPT

## 2017-11-26 PROCEDURE — 80053 COMPREHEN METABOLIC PANEL: CPT

## 2017-11-26 PROCEDURE — 96376 TX/PRO/DX INJ SAME DRUG ADON: CPT

## 2017-11-26 PROCEDURE — 96375 TX/PRO/DX INJ NEW DRUG ADDON: CPT

## 2017-11-26 PROCEDURE — 96361 HYDRATE IV INFUSION ADD-ON: CPT

## 2017-11-26 PROCEDURE — 99285 EMERGENCY DEPT VISIT HI MDM: CPT

## 2017-11-26 PROCEDURE — 76830 TRANSVAGINAL US NON-OB: CPT

## 2017-11-26 PROCEDURE — 74177 CT ABD & PELVIS W/CONTRAST: CPT

## 2017-11-26 PROCEDURE — 84703 CHORIONIC GONADOTROPIN ASSAY: CPT

## 2017-11-26 PROCEDURE — 36415 COLL VENOUS BLD VENIPUNCTURE: CPT

## 2017-11-26 PROCEDURE — 96374 THER/PROPH/DIAG INJ IV PUSH: CPT

## 2017-11-26 NOTE — RAD
US PELVIS W/TV



History:pelvic pain, irregular periods 



Comparison: CT earlier the same day



Findings:Multiple transabdominal sonographic images of the pelvis are

submitted. Right ovary measured 2 x 2.8 x 3.1 cm with normal low resistance

vascularity. Left ovary measured 2.3 x 3.2 x 4 cm with normal low resistance

vascularity. Uterus measured 11.8 x 5.4 cm. Endometrium measured 1.1 cm.



Transvaginal ultrasound: Multiple transvaginal sonographic images of the

pelvis are submitted. Urinary uterus is retroverted. Uterus measured 9.3 x 6 x

5.3 cm. There is a small to moderate quantity of free fluid in the pelvis.

Left ovary measured 4.6 x 2.3 x 3.8 cm. There are a few follicles present.

Dominant follicle or small cyst of left ovary measured up to 1.6 cm. Echogenic

focus near left ovary is most compatible with tubal occlusion device. Right

ovary measured 2 x 3 x 4.3 cm. There are follicles of the right ovary. There

is a small hyperechoic lesion of the right ovary up to 0.4 x 0.5 x 0.4 cm in

size not associated with significant internal vascularity. There is some fluid

in the cervical canal.



Impression:

1.There is a small to moderate quantity of nonspecific free fluid in the

pelvis. There is a small cyst of the left ovary. There is a small nonspecific

hyperechoic lesion of the right ovary, possibly small hemorrhagic cyst, no

discernible fat density on CT.

## 2017-11-26 NOTE — ED.ADGEN
Past History


Past Medical History:  Asthma, Other


Past Surgical History:  No Surgical History


Alcohol Use:  None


Drug Use:  None





Adult General


Chief Complaint


Chief Complaint


Vomiting diarrhea cough





HPI


HPI





Patient is a 27-year-old female with history of Crohn's disease who presents 

with nonproductive cough, body aches, nausea vomiting and diarrhea the past 2 

days. Patient days nausea has now improved but now reports constipation. Fever 

chills, sweats. No urinary frequency urgency or hematuria. Last menstrual 

period was 1/18. Patient is currently on suppressive medications for Crohn's 

disease [place]





Review of Systems


Review of Systems


ROS as per HPI. 





All other systems were reviewed and found to be within normal limits, except as 

documented in this note.





Current Medications


Current Medications





Current Medications








 Medications


  (Trade)  Dose


 Ordered  Sig/Sandy  Start Time


 Stop Time Status Last Admin


Dose Admin


 


 Fentanyl Citrate


  (Fentanyl 2ml


 Vial)  50 mcg  1X  ONCE  11/26/17 12:15


 11/26/17 12:16 DC 11/26/17 12:25


50 MCG


 


 Iohexol


  (Omnipaque 300


 Mg/ml)  75 ml  1X  ONCE  11/26/17 10:45


 11/26/17 10:46 DC 11/26/17 10:50


75 ML


 


 Morphine Sulfate


  (Morphine 4mg


 Syringe)  4 mg  1X  ONCE  11/26/17 11:30


 11/26/17 11:31 DC 11/26/17 11:24


4 MG


 


 Ondansetron HCl


  (Zofran)  4 mg  1X  ONCE  11/26/17 10:30


 11/26/17 10:31 DC 11/26/17 10:29


4 MG


 


 Phenazopyridine


 HCl


  (Pyridium)  200 mg  1X  ONCE  11/26/17 12:00


 11/26/17 12:01 DC  


 


 


 Sodium Chloride  1,000 ml @ 


 1,000 mls/hr  1X  ONCE  11/26/17 09:30


 11/26/17 10:29 DC 11/26/17 09:46


1,000 MLS/HR











Allergies


Allergies





Allergies








Coded Allergies Type Severity Reaction Last Updated Verified


 


  amoxicillin Allergy Unknown  11/26/17 Yes











Physical Exam


Physical Exam





Constitutional: Well developed, well nourished, no acute distress, non-toxic 

appearance. []


HENT: Normocephalic, atraumatic, bilateral external ears normal, oropharynx 

moist, no oral exudates, nose normal. []


Eyes: PERRLA, EOMI, conjunctiva normal, no discharge. [] 


Neck: Normal range of motion, no tenderness, supple, no stridor. [] 


Cardiovascular:Heart rate regular rhythm, no murmur []


Lungs & Thorax:  Bilateral breath sounds clear to auscultation []


Abdomen: Bowel sounds normal, soft, diffuse lower abdominal pain last tenderness

, no rebound rigidity or guarding, negative McBurney's sign.. [] 


Skin: Warm, dry, no erythema, no rash. [] 


Back: No tenderness, no CVA tenderness. [] 


Extremities: No tenderness, no cyanosis, no clubbing, ROM intact, no edema. [] 


Neurologic: Alert and oriented X 3, normal motor function, normal sensory 

function, no focal deficits noted. []


Psychologic: Affect normal, judgement normal, mood normal. []





Current Patient Data


Vital Signs





 Vital Signs








  Date Time  Temp Pulse Resp B/P (MAP) Pulse Ox O2 Delivery O2 Flow Rate FiO2


 


11/26/17 12:25   16  98   


 


11/26/17 10:32  76  98/58 (71)  Room Air  


 


11/26/17 09:15 98.4       








Lab Results





 Laboratory Tests








Test


  11/26/17


09:42


 


White Blood Count


  5.4 x10^3/uL


(4.0-11.0)


 


Red Blood Count


  4.50 x10^6/uL


(3.50-5.40)


 


Hemoglobin


  12.3 g/dL


(12.0-15.5)


 


Hematocrit


  36.8 %


(36.0-47.0)


 


Mean Corpuscular Volume


  82 fL ()


 


 


Mean Corpuscular Hemoglobin 27 pg (25-35)  


 


Mean Corpuscular Hemoglobin


Concent 33 g/dL


(31-37)


 


Red Cell Distribution Width


  13.8 %


(11.5-14.5)


 


Platelet Count


  188 x10^3/uL


(140-400)


 


Neutrophils (%) (Auto) 61 % (31-73)  


 


Lymphocytes (%) (Auto) 19 % (24-48)  L


 


Monocytes (%) (Auto) 15 % (0-9)  H


 


Eosinophils (%) (Auto) 4 % (0-3)  H


 


Basophils (%) (Auto) 1 % (0-3)  


 


Neutrophils # (Auto)


  3.3 x10^3uL


(1.8-7.7)


 


Lymphocytes # (Auto)


  1.0 x10^3/uL


(1.0-4.8)


 


Monocytes # (Auto)


  0.8 x10^3/uL


(0.0-1.1)


 


Eosinophils # (Auto)


  0.2 x10^3/uL


(0.0-0.7)


 


Basophils # (Auto)


  0.1 x10^3/uL


(0.0-0.2)


 


Urine Collection Type Unknown  


 


Urine Color Yellow  


 


Urine Clarity Clear  


 


Urine pH 7.0  


 


Urine Specific Gravity 1.020  


 


Urine Protein


  Neg


(NEG-TRACE)


 


Urine Glucose (UA)


  Neg mg/dL


(NEG)


 


Urine Ketones (Stick)


  Neg mg/dL


(NEG)


 


Urine Blood Neg (NEG)  


 


Urine Nitrite Neg (NEG)  


 


Urine Bilirubin Neg (NEG)  


 


Urine Urobilinogen Dipstick


  0.2 mg/dL (0.2


mg/dL)


 


Urine Leukocyte Esterase Neg (NEG)  


 


Urine RBC


  Rare /HPF


(0-2)


 


Urine WBC


  Rare /HPF


(0-4)


 


Urine Squamous Epithelial


Cells Few /LPF  


 


 


Urine Bacteria


  Few /HPF


(0-FEW)


 


Urine Mucus Mod /LPF  


 


Sodium Level


  142 mmol/L


(136-145)


 


Potassium Level


  3.7 mmol/L


(3.5-5.1)


 


Chloride Level


  107 mmol/L


()


 


Carbon Dioxide Level


  27 mmol/L


(21-32)


 


Anion Gap 8 (6-14)  


 


Blood Urea Nitrogen


  4 mg/dL (7-20)


L


 


Creatinine


  0.5 mg/dL


(0.6-1.0)  L


 


Estimated GFR


(Cockcroft-Gault) 145.9  


 


 


BUN/Creatinine Ratio 8 (6-20)  


 


Glucose Level


  86 mg/dL


(70-99)


 


Calcium Level


  8.2 mg/dL


(8.5-10.1)  L


 


Total Bilirubin


  0.1 mg/dL


(0.2-1.0)  L


 


Aspartate Amino Transferase


(AST) 22 U/L (15-37)


 


 


Alanine Aminotransferase (ALT)


  18 U/L (14-59)


 


 


Alkaline Phosphatase


  56 U/L


()


 


C-Reactive Protein


  2.8 mg/L


(0-3.3)


 


Total Protein


  7.1 g/dL


(6.4-8.2)


 


Albumin


  3.3 g/dL


(3.4-5.0)  L


 


Albumin/Globulin Ratio


  0.9 (1.0-1.7)


L


 


Serum Pregnancy Test,


Qualitative Negative (NEG)


 











EKG


EKG


[]





Radiology/Procedures


Radiology/Procedures


[CT abdomen pelvis: Appendix not clearly identified, pelvic fluid with possible 

left adnexal assessed her tubal ovarian abscess, parametrial fullness. 





US pelvis: Small-to-moderate quantity of nonspecific free fluid in the pelvis. 

Small cyst in left ovary. Hyperechoic lesion on the right ovary,'s possible 

small hemorrhagic cyst.]





Course & Med Decision Making


Course & Med Decision Making


Pertinent Labs and Imaging studies reviewed. (See chart for details)





[Symptoms improved with tx: US/CT reviewed. Recommend close PCP/GYN follow up.  

Return precautions reviewed.  ]





Final Impression


Final Impression


[1. Pelvic pain in female


 2. Ovarian cyst]


Problems:  





Dragon Disclaimer


Dragon Disclaimer


This electronic medical record was generated, in whole or in part, using a 

voice recognition dictation system.











EDUARD CRUZ DO Nov 26, 2017 10:15

## 2017-11-26 NOTE — RAD
CT ABD PELV W/ IV CONTRST ONLY



History:Left lower quadrant abdominal pain for 2 days of vomiting and fever



Technique: After administration of intravenous contrast, CT imaging was

performed of the abdomen and pelvis, multiplanar reconstruction images

submitted. No oral contrast was given as per request. Exposure: One or more of

the following individualized dose reduction techniques were utilized for this

exam: 1. Automated exposure control.2. Adjustment of the mA and/or KV

according to patient size.3. Use of iterative reconstruction technique.



Contrast: 75 cc Omnipaque 300



Comparison: None



Findings: There is no abnormality limited visualized lung bases. No focal

abnormality is identified of the liver, spleen, pancreas. Both kidneys

enhance. There is right pelviectasis/extrarenal pelvis. There is no adrenal

nodularity. There is a small quantity of dependent free fluid in the pelvis.

There is no free air. Accurate evaluation of bowel is limited without oral

contrast. Bowel is not considered significantly dilated. Cecum is deviated

more superiorly. Appendix cannot be clearly identified. There are bilateral

tubal occlusion devices. There is nonspecific fullness of the parametrial

regions. There is likely partially collapsed left adnexal cyst 1.6 cm. There

is nonspecific mild fullness of the vascular arcades of the mesentery.





Impression:

1.Appendix cannot be clearly identified to exclude acute appendicitis by

imaging. Cecum is deviated more superiorly. There is nonspecific mild fullness

of the vascular arcades of the mesentery although no obvious bowel wall

thickening.

2. There is a small quantity of nonspecific free fluid in the pelvis. There is

what likely represents a partially collapsed left adnexal cyst unless clinical

suspicion for small tubo-ovarian abscess. There is nonspecific fullness of the

 parametrial regions bilaterally, question if any suspicion for pelvic

inflammatory disease?.

## 2018-01-24 ENCOUNTER — HOSPITAL ENCOUNTER (EMERGENCY)
Dept: HOSPITAL 63 - ER | Age: 28
LOS: 1 days | Discharge: ANOTHER HOSPITAL | End: 2018-01-25
Payer: OTHER GOVERNMENT

## 2018-01-24 VITALS — BODY MASS INDEX: 24.05 KG/M2 | WEIGHT: 153.22 LBS | HEIGHT: 67 IN

## 2018-01-24 DIAGNOSIS — R07.89: ICD-10-CM

## 2018-01-24 DIAGNOSIS — G89.18: Primary | ICD-10-CM

## 2018-01-24 DIAGNOSIS — Z90.710: ICD-10-CM

## 2018-01-24 DIAGNOSIS — J45.909: ICD-10-CM

## 2018-01-24 DIAGNOSIS — Z98.890: ICD-10-CM

## 2018-01-24 DIAGNOSIS — Z88.1: ICD-10-CM

## 2018-01-24 DIAGNOSIS — Z86.2: ICD-10-CM

## 2018-01-24 DIAGNOSIS — Z91.013: ICD-10-CM

## 2018-01-24 DIAGNOSIS — R10.32: ICD-10-CM

## 2018-01-24 DIAGNOSIS — Z91.010: ICD-10-CM

## 2018-01-24 PROCEDURE — 80076 HEPATIC FUNCTION PANEL: CPT

## 2018-01-24 PROCEDURE — 96361 HYDRATE IV INFUSION ADD-ON: CPT

## 2018-01-24 PROCEDURE — 84703 CHORIONIC GONADOTROPIN ASSAY: CPT

## 2018-01-24 PROCEDURE — 85025 COMPLETE CBC W/AUTO DIFF WBC: CPT

## 2018-01-24 PROCEDURE — 71275 CT ANGIOGRAPHY CHEST: CPT

## 2018-01-24 PROCEDURE — 96376 TX/PRO/DX INJ SAME DRUG ADON: CPT

## 2018-01-24 PROCEDURE — 81025 URINE PREGNANCY TEST: CPT

## 2018-01-24 PROCEDURE — 96375 TX/PRO/DX INJ NEW DRUG ADDON: CPT

## 2018-01-24 PROCEDURE — 83690 ASSAY OF LIPASE: CPT

## 2018-01-24 PROCEDURE — 99285 EMERGENCY DEPT VISIT HI MDM: CPT

## 2018-01-24 PROCEDURE — 74177 CT ABD & PELVIS W/CONTRAST: CPT

## 2018-01-24 PROCEDURE — 96374 THER/PROPH/DIAG INJ IV PUSH: CPT

## 2018-01-24 PROCEDURE — 36415 COLL VENOUS BLD VENIPUNCTURE: CPT

## 2018-01-24 PROCEDURE — 81001 URINALYSIS AUTO W/SCOPE: CPT

## 2018-01-24 PROCEDURE — 80048 BASIC METABOLIC PNL TOTAL CA: CPT

## 2018-01-25 VITALS — DIASTOLIC BLOOD PRESSURE: 55 MMHG | SYSTOLIC BLOOD PRESSURE: 95 MMHG

## 2018-01-25 LAB
ALBUMIN SERPL-MCNC: 3.4 G/DL (ref 3.4–5)
ALP SERPL-CCNC: 59 U/L (ref 46–116)
ALT SERPL-CCNC: 16 U/L (ref 14–59)
ANION GAP SERPL CALC-SCNC: 7 MMOL/L (ref 6–14)
APTT PPP: YELLOW S
AST SERPL-CCNC: 16 U/L (ref 15–37)
BACTERIA #/AREA URNS HPF: 0 /HPF
BASOPHILS # BLD AUTO: 0.1 X10^3/UL (ref 0–0.2)
BASOPHILS NFR BLD: 1 % (ref 0–3)
BILIRUB DIRECT SERPL-MCNC: 0.1 MG/DL (ref 0–0.2)
BILIRUB SERPL-MCNC: 0.1 MG/DL (ref 0.2–1)
BILIRUB UR QL STRIP: (no result)
CA-I SERPL ISE-MCNC: 14 MG/DL (ref 7–20)
CALCIUM SERPL-MCNC: 9.1 MG/DL (ref 8.5–10.1)
CHLORIDE SERPL-SCNC: 103 MMOL/L (ref 98–107)
CO2 SERPL-SCNC: 30 MMOL/L (ref 21–32)
CREAT SERPL-MCNC: 0.6 MG/DL (ref 0.6–1)
EOSINOPHIL NFR BLD: 0.3 X10^3/UL (ref 0–0.7)
EOSINOPHIL NFR BLD: 4 % (ref 0–3)
ERYTHROCYTE [DISTWIDTH] IN BLOOD BY AUTOMATED COUNT: 13.6 % (ref 11.5–14.5)
FIBRINOGEN PPP-MCNC: CLEAR MG/DL
GFR SERPLBLD BASED ON 1.73 SQ M-ARVRAT: 118.2 ML/MIN
GLUCOSE SERPL-MCNC: 85 MG/DL (ref 70–99)
GLUCOSE UR STRIP-MCNC: (no result) MG/DL
HCT VFR BLD CALC: 36.6 % (ref 36–47)
HGB BLD-MCNC: 12.4 G/DL (ref 12–15.5)
LIPASE: 74 U/L (ref 73–393)
LYMPHOCYTES # BLD: 2 X10^3/UL (ref 1–4.8)
LYMPHOCYTES NFR BLD AUTO: 25 % (ref 24–48)
MCH RBC QN AUTO: 27 PG (ref 25–35)
MCHC RBC AUTO-ENTMCNC: 34 G/DL (ref 31–37)
MCV RBC AUTO: 81 FL (ref 79–100)
MONO #: 0.7 X10^3/UL (ref 0–1.1)
MONOCYTES NFR BLD: 9 % (ref 0–9)
NEUT #: 5 X10^3UL (ref 1.8–7.7)
NEUTROPHILS NFR BLD AUTO: 62 % (ref 31–73)
NITRITE UR QL STRIP: (no result)
PLATELET # BLD AUTO: 197 X10^3/UL (ref 140–400)
POTASSIUM SERPL-SCNC: 3.7 MMOL/L (ref 3.5–5.1)
PREG TEST PT QUAL: NEGATIVE
PROT SERPL-MCNC: 7.4 G/DL (ref 6.4–8.2)
RBC # BLD AUTO: 4.51 X10^6/UL (ref 3.5–5.4)
RBC #/AREA URNS HPF: (no result) /HPF (ref 0–2)
SODIUM SERPL-SCNC: 140 MMOL/L (ref 136–145)
SP GR UR STRIP: 1.02
SQUAMOUS #/AREA URNS LPF: (no result) /LPF
UROBILINOGEN UR-MCNC: 0.2 MG/DL
WBC # BLD AUTO: 8.2 X10^3/UL (ref 4–11)
WBC #/AREA URNS HPF: (no result) /HPF (ref 0–4)

## 2018-01-25 NOTE — RAD
PQRS Compliance Statement:

 

One or more of the following individualized dose reduction techniques were

utilized for this examination:  

1. Automated exposure control  

2. Adjustment of the mA and/or kV according to patient size  

3. Use of iterative reconstruction technique

 

 

CT ANGIOGRAPHY CHEST, CT ABD PELV W/ IV CONTRST ONLY

 

Clinical Indication: Chest pain, short of air, post op hysterectomy 

1/19/18, abdomen pain. 

 

Comparison: None.

 

TECHNIQUE: Helical CT imaging of the chest is performed after 75 cc 

Omnipaque 300 IV contrast using pulmonary angiogram protocol. 3-D MIP 

reconstruction of the pulmonary arteries. Helical CT imaging continued in 

the abdomen and pelvis. Oral contrast not given.

 

Findings: 

Pulmonary arteries are adequately contrast opacified. No CT evidence of 

pulmonary embolus.

 

No thoracic aortic dissection. Thyroid is symmetric. Subcentimeter 

mediastinal lymph nodes. Great vessels normal caliber. Cardiac size 

normal, no pericardial effusion.

 

Central airways are patent. Trace bilateral pleural effusions. There is 

mild bilateral lower lobe dependent atelectasis.

 

Mild intraperitoneal free air. Liver, gallbladder, spleen, pancreas, 

adrenal glands, abdominal aorta, and kidneys are normal.

 

Stomach unremarkable. No dilated small bowel. There is no colon wall 

thickening. No secondary signs of appendicitis. No abdominal adenopathy or

free fluid.

 

Urinary bladder is normal. Uterus surgically absent. Mild enhancement of 

the vaginal cuff is probably postsurgical. There is a right ovary 

functional cyst measuring up to 2.4 cm. There is mild pelvic free fluid 

and induration.

 

No acute bone abnormality.

 

IMPRESSION:

1.  Postsurgical changes of hysterectomy. No evidence of abscess.

2.  No CT evidence of pulmonary embolus.

3.  Trace bilateral pleural effusions.

4.  Right ovary functional cyst.

 

Electronically signed by: Facundo Davalos MD (1/25/2018 3:18 AM) [facility]

## 2018-01-25 NOTE — RAD
PQRS Compliance Statement:

 

One or more of the following individualized dose reduction techniques were

utilized for this examination:  

1. Automated exposure control  

2. Adjustment of the mA and/or kV according to patient size  

3. Use of iterative reconstruction technique

 

 

CT ANGIOGRAPHY CHEST, CT ABD PELV W/ IV CONTRST ONLY

 

Clinical Indication: Chest pain, short of air, post op hysterectomy 

1/19/18, abdomen pain. 

 

Comparison: None.

 

TECHNIQUE: Helical CT imaging of the chest is performed after 75 cc 

Omnipaque 300 IV contrast using pulmonary angiogram protocol. 3-D MIP 

reconstruction of the pulmonary arteries. Helical CT imaging continued in 

the abdomen and pelvis. Oral contrast not given.

 

Findings: 

Pulmonary arteries are adequately contrast opacified. No CT evidence of 

pulmonary embolus.

 

No thoracic aortic dissection. Thyroid is symmetric. Subcentimeter 

mediastinal lymph nodes. Great vessels normal caliber. Cardiac size 

normal, no pericardial effusion.

 

Central airways are patent. Trace bilateral pleural effusions. There is 

mild bilateral lower lobe dependent atelectasis.

 

Mild intraperitoneal free air. Liver, gallbladder, spleen, pancreas, 

adrenal glands, abdominal aorta, and kidneys are normal.

 

Stomach unremarkable. No dilated small bowel. There is no colon wall 

thickening. No secondary signs of appendicitis. No abdominal adenopathy or

free fluid.

 

Urinary bladder is normal. Uterus surgically absent. Mild enhancement of 

the vaginal cuff is probably postsurgical. There is a right ovary 

functional cyst measuring up to 2.4 cm. There is mild pelvic free fluid 

and induration.

 

No acute bone abnormality.

 

IMPRESSION:

1.  Postsurgical changes of hysterectomy. No evidence of abscess.

2.  No CT evidence of pulmonary embolus.

3.  Trace bilateral pleural effusions.

4.  Right ovary functional cyst.

 

Electronically signed by: Facundo Davalos MD (1/25/2018 3:18 AM) [facility]

## 2018-01-25 NOTE — PHYS DOC
Past History


Past Medical History:  Anemia, Asthma, Other


Past Surgical History:  Hysterectomy


Alcohol Use:  None


Drug Use:  None





Adult General


Chief Complaint


Chief Complaint:  POST-OP PROBLEM





HPI


HPI


27-year-old female who is postoperative day 5 after having a laparoscopic and 

robotic hysterectomy at [facility] who presents the 

emergency department today with both abdominal pain and chest pain. She 

describes the pain in her abdomen as a sharp shooting pain and reports hearing 

a pop in her left lower quadrant. The pain radiates diffusely throughout the 

abdomen. It is moderate to severe intermittent and without alleviating factors. 





Second chief complaint. Chest pain. The patient describes a sharp shooting pain 

that started after having her surgery it is nonradiating constant and worse 

with deep breaths and nonmigratory. She has not had a cough or fever at home. 

She denies associated diaphoresis.





Review of systems is negative for fevers chills diarrhea constipation. She 

reports having flatus. All other review of systems is negative unless otherwise 

noted in history of present illness.





ED course: 27-year-old female presenting to the emergency department with 

abdominal pain and chest pain. On arrival the patient is afebrile with a normal 

heart rate. Saturating well on room air. On examination of the abdomen she has 

a soft nondistended abdomen with tenderness palpation in the left lower 

quadrant without rebound tenderness or guarding. Patient's laparoscopic sites 

are clean dry and intact. Lungs are clear to auscultation bilaterally. No 

wheezing. Blood work sent. IV established. IV fluids nausea and pain 

medications given. CT angiogram of the chest along with CT IV contrast of the 

abdomen pelvis were obtained. No evidence of pulmonary embolism. No acute 

complications on CT. Blood work unremarkable. On reexamination the patient 

continues to be in moderate to severe pain after IV pain medications given. I 

called the patient's OB/GYN team (Dr. Abdullahi) at [facility] who accepted the patient for admission. The patient was then transferred 

to them for further evaluation treatment and care.





Review of Systems


Review of Systems


SEE ABOVE.





Current Medications


Current Medications





Current Medications








 Medications


  (Trade)  Dose


 Ordered  Sig/Sandy  Start Time


 Stop Time Status Last Admin


Dose Admin


 


 Fentanyl Citrate


  (Fentanyl 2ml


 Vial)  100 mcg  STK-MED ONCE  1/25/18 03:48


 1/25/18 03:49 DC  


 


 


 Info


  (Do NOT chart on


 this entry -- for


 MONITORING)  1 each  PRN DAILY  PRN  1/25/18 02:30


 1/27/18 02:29   


 


 


 Iohexol


  (Omnipaque 300


 Mg/ml)  75 ml  1X  ONCE  1/25/18 02:30


 1/25/18 02:31 DC 1/25/18 02:35


75 ML


 


 Ondansetron HCl


  (Zofran)  4 mg  1X  ONCE  1/25/18 01:00


 1/25/18 01:01 DC 1/25/18 01:00


4 MG


 


 Sodium Chloride  1,000 ml @ 


 1,000 mls/hr  1X  ONCE  1/25/18 04:00


 1/25/18 04:59  1/25/18 03:51


1,000 MLS/HR











Allergies


Allergies





Allergies








Coded Allergies Type Severity Reaction Last Updated Verified


 


  amoxicillin Allergy Unknown  11/26/17 Yes


 


  peanut Allergy Unknown  1/24/18 Yes


 


  shrimp Allergy Unknown  1/24/18 Yes











Physical Exam


Physical Exam


SEE ABOVE





Constitutional: Well developed, well nourished, no acute distress, non-toxic 

appearance. []


HENT: Normocephalic, atraumatic, bilateral external ears normal, oropharynx 

moist, no oral exudates, nose normal. 


Eyes: PERRLA, EOMI, conjunctiva normal, no discharge. [] 


Neck: Normal range of motion, no tenderness, supple, no stridor.  


Cardiovascular:Heart rate regular rhythm, no murmur []


Lungs & Thorax:  Bilateral breath sounds clear to auscultation 


Abdomen: SEE ABOVE


Skin: Warm, dry, no erythema, no rash. [] 


Back: No tenderness, no CVA tenderness. [] 


Extremities: No tenderness, no cyanosis, no clubbing, ROM intact, no edema.  No 

evidence of DVT.


Neurologic: Alert and oriented X 3, normal motor function, normal sensory 

function, no focal deficits noted. []


Psychologic: Affect normal, judgement normal, mood normal.





Current Patient Data


Vital Signs





 Vital Signs








  Date Time  Temp Pulse Resp B/P (MAP) Pulse Ox O2 Delivery O2 Flow Rate FiO2


 


1/24/18 23:25 98.0 79 22  98 Room Air  








Lab Results





 Laboratory Tests








Test


  1/25/18


00:34 1/25/18


00:45 1/25/18


00:52


 


Urine Collection Type Unknown    


 


Urine Color Yellow    


 


Urine Clarity Clear    


 


Urine pH 5.5    


 


Urine Specific Gravity 1.025    


 


Urine Protein


  Neg


(NEG-TRACE) 


  


 


 


Urine Glucose (UA)


  Neg mg/dL


(NEG) 


  


 


 


Urine Ketones (Stick)


  Trace mg/dL


(NEG) 


  


 


 


Urine Blood Small (NEG)    


 


Urine Nitrite Neg (NEG)    


 


Urine Bilirubin Neg (NEG)    


 


Urine Urobilinogen Dipstick


  0.2 mg/dL (0.2


mg/dL) 


  


 


 


Urine Leukocyte Esterase Neg (NEG)    


 


Urine RBC


  Occ /HPF (0-2)


  


  


 


 


Urine WBC


  Occ /HPF (0-4)


  


  


 


 


Urine Squamous Epithelial


Cells Many /LPF  


  


  


 


 


Urine Bacteria


  0 /HPF (0-FEW)


  


  


 


 


POC Urine HCG, Qualitative


  


  hcg negative


(Negative) 


 


 


White Blood Count


  


  


  8.2 x10^3/uL


(4.0-11.0)


 


Red Blood Count


  


  


  4.51 x10^6/uL


(3.50-5.40)


 


Hemoglobin


  


  


  12.4 g/dL


(12.0-15.5)


 


Hematocrit


  


  


  36.6 %


(36.0-47.0)


 


Mean Corpuscular Volume


  


  


  81 fL ()


 


 


Mean Corpuscular Hemoglobin   27 pg (25-35)  


 


Mean Corpuscular Hemoglobin


Concent 


  


  34 g/dL


(31-37)


 


Red Cell Distribution Width


  


  


  13.6 %


(11.5-14.5)


 


Platelet Count


  


  


  197 x10^3/uL


(140-400)


 


Neutrophils (%) (Auto)   62 % (31-73)  


 


Lymphocytes (%) (Auto)   25 % (24-48)  


 


Monocytes (%) (Auto)   9 % (0-9)  


 


Eosinophils (%) (Auto)   4 % (0-3)  H


 


Basophils (%) (Auto)   1 % (0-3)  


 


Neutrophils # (Auto)


  


  


  5.0 x10^3uL


(1.8-7.7)


 


Lymphocytes # (Auto)


  


  


  2.0 x10^3/uL


(1.0-4.8)


 


Monocytes # (Auto)


  


  


  0.7 x10^3/uL


(0.0-1.1)


 


Eosinophils # (Auto)


  


  


  0.3 x10^3/uL


(0.0-0.7)


 


Basophils # (Auto)


  


  


  0.1 x10^3/uL


(0.0-0.2)


 


Sodium Level


  


  


  140 mmol/L


(136-145)


 


Potassium Level


  


  


  3.7 mmol/L


(3.5-5.1)


 


Chloride Level


  


  


  103 mmol/L


()


 


Carbon Dioxide Level


  


  


  30 mmol/L


(21-32)


 


Anion Gap   7 (6-14)  


 


Blood Urea Nitrogen


  


  


  14 mg/dL


(7-20)


 


Creatinine


  


  


  0.6 mg/dL


(0.6-1.0)


 


Estimated GFR


(Cockcroft-Gault) 


  


  118.2  


 


 


Glucose Level


  


  


  85 mg/dL


(70-99)


 


Calcium Level


  


  


  9.1 mg/dL


(8.5-10.1)


 


Total Bilirubin


  


  


  0.1 mg/dL


(0.2-1.0)  L


 


Direct Bilirubin


  


  


  0.1 mg/dL


(0.0-0.2)


 


Aspartate Amino Transferase


(AST) 


  


  16 U/L (15-37)


 


 


Alanine Aminotransferase (ALT)


  


  


  16 U/L (14-59)


 


 


Alkaline Phosphatase


  


  


  59 U/L


()


 


Total Protein


  


  


  7.4 g/dL


(6.4-8.2)


 


Albumin


  


  


  3.4 g/dL


(3.4-5.0)


 


Lipase


  


  


  74 U/L


()


 


Serum Pregnancy Test,


Qualitative 


  


  Negative (NEG)


 











EKG


EKG


[]





Radiology/Procedures


Radiology/Procedures


[]





Course & Med Decision Making


Course & Med Decision Making


Pertinent Labs and Imaging studies reviewed. (See chart for details)





[]





Dragon Disclaimer


Dragon Disclaimer


This electronic medical record was generated, in whole or in part, using a 

voice recognition dictation system.





Departure


Departure:


Impression:  


 Primary Impression:  


 Abdominal pain


 Additional Impression:  


 Chest pain


Disposition:  02 XFER SHT-TRM HOSP ([facility])


Condition:  STABLE


Referrals:  


JENNI MARINO MD (PCP)





Problem Qualifiers











ZAYDA GUPTA MD Jan 25, 2018 04:08

## 2018-02-18 ENCOUNTER — HOSPITAL ENCOUNTER (EMERGENCY)
Dept: HOSPITAL 63 - ER | Age: 28
Discharge: HOME | End: 2018-02-18
Payer: OTHER GOVERNMENT

## 2018-02-18 ENCOUNTER — HOSPITAL ENCOUNTER (EMERGENCY)
Dept: HOSPITAL 61 - ER | Age: 28
Discharge: LEFT BEFORE DISCHARGE | End: 2018-02-18
Payer: OTHER GOVERNMENT

## 2018-02-18 VITALS — BODY MASS INDEX: 23.19 KG/M2 | WEIGHT: 153 LBS | HEIGHT: 68 IN

## 2018-02-18 VITALS — SYSTOLIC BLOOD PRESSURE: 111 MMHG | DIASTOLIC BLOOD PRESSURE: 75 MMHG

## 2018-02-18 DIAGNOSIS — Z90.710: ICD-10-CM

## 2018-02-18 DIAGNOSIS — Z91.013: ICD-10-CM

## 2018-02-18 DIAGNOSIS — N93.9: Primary | ICD-10-CM

## 2018-02-18 DIAGNOSIS — K50.911: ICD-10-CM

## 2018-02-18 DIAGNOSIS — G89.18: ICD-10-CM

## 2018-02-18 DIAGNOSIS — N99.820: Primary | ICD-10-CM

## 2018-02-18 DIAGNOSIS — Z53.21: ICD-10-CM

## 2018-02-18 DIAGNOSIS — Z91.010: ICD-10-CM

## 2018-02-18 DIAGNOSIS — Z98.890: ICD-10-CM

## 2018-02-18 DIAGNOSIS — Z88.1: ICD-10-CM

## 2018-02-18 LAB
ALBUMIN SERPL-MCNC: 3.6 G/DL (ref 3.4–5)
ALP SERPL-CCNC: 63 U/L (ref 46–116)
ALT SERPL-CCNC: 18 U/L (ref 14–59)
ANION GAP SERPL CALC-SCNC: 8 MMOL/L (ref 6–14)
APTT PPP: YELLOW S
AST SERPL-CCNC: 18 U/L (ref 15–37)
BACTERIA #/AREA URNS HPF: 0 /HPF
BASOPHILS # BLD AUTO: 0.1 X10^3/UL (ref 0–0.2)
BASOPHILS NFR BLD: 1 % (ref 0–3)
BILIRUB DIRECT SERPL-MCNC: < 0.1 MG/DL (ref 0–0.2)
BILIRUB SERPL-MCNC: 0.2 MG/DL (ref 0.2–1)
BILIRUB UR QL STRIP: (no result)
CA-I SERPL ISE-MCNC: 10 MG/DL (ref 7–20)
CALCIUM SERPL-MCNC: 8.5 MG/DL (ref 8.5–10.1)
CHLORIDE SERPL-SCNC: 104 MMOL/L (ref 98–107)
CO2 SERPL-SCNC: 28 MMOL/L (ref 21–32)
CREAT SERPL-MCNC: 0.7 MG/DL (ref 0.6–1)
EOSINOPHIL NFR BLD: 0.3 X10^3/UL (ref 0–0.7)
EOSINOPHIL NFR BLD: 5 % (ref 0–3)
ERYTHROCYTE [DISTWIDTH] IN BLOOD BY AUTOMATED COUNT: 14 % (ref 11.5–14.5)
FIBRINOGEN PPP-MCNC: CLEAR MG/DL
GFR SERPLBLD BASED ON 1.73 SQ M-ARVRAT: 98.9 ML/MIN
GLUCOSE SERPL-MCNC: 112 MG/DL (ref 70–99)
GLUCOSE UR STRIP-MCNC: (no result) MG/DL
HCT VFR BLD CALC: 36.7 % (ref 36–47)
HGB BLD-MCNC: 12 G/DL (ref 12–15.5)
LYMPHOCYTES # BLD: 2 X10^3/UL (ref 1–4.8)
LYMPHOCYTES NFR BLD AUTO: 32 % (ref 24–48)
MCH RBC QN AUTO: 27 PG (ref 25–35)
MCHC RBC AUTO-ENTMCNC: 33 G/DL (ref 31–37)
MCV RBC AUTO: 81 FL (ref 79–100)
MONO #: 0.6 X10^3/UL (ref 0–1.1)
MONOCYTES NFR BLD: 9 % (ref 0–9)
NEUT #: 3.3 X10^3UL (ref 1.8–7.7)
NEUTROPHILS NFR BLD AUTO: 53 % (ref 31–73)
NITRITE UR QL STRIP: (no result)
PLATELET # BLD AUTO: 215 X10^3/UL (ref 140–400)
POTASSIUM SERPL-SCNC: 3.5 MMOL/L (ref 3.5–5.1)
PROT SERPL-MCNC: 7.5 G/DL (ref 6.4–8.2)
RBC # BLD AUTO: 4.51 X10^6/UL (ref 3.5–5.4)
RBC #/AREA URNS HPF: (no result) /HPF (ref 0–2)
SODIUM SERPL-SCNC: 140 MMOL/L (ref 136–145)
SP GR UR STRIP: 1.02
SQUAMOUS #/AREA URNS LPF: (no result) /LPF
UROBILINOGEN UR-MCNC: 0.2 MG/DL
WBC # BLD AUTO: 6.3 X10^3/UL (ref 4–11)
WBC #/AREA URNS HPF: (no result) /HPF (ref 0–4)

## 2018-02-18 PROCEDURE — 96375 TX/PRO/DX INJ NEW DRUG ADDON: CPT

## 2018-02-18 PROCEDURE — 96374 THER/PROPH/DIAG INJ IV PUSH: CPT

## 2018-02-18 PROCEDURE — 99285 EMERGENCY DEPT VISIT HI MDM: CPT

## 2018-02-18 PROCEDURE — 80048 BASIC METABOLIC PNL TOTAL CA: CPT

## 2018-02-18 PROCEDURE — 74177 CT ABD & PELVIS W/CONTRAST: CPT

## 2018-02-18 PROCEDURE — 81001 URINALYSIS AUTO W/SCOPE: CPT

## 2018-02-18 PROCEDURE — 96361 HYDRATE IV INFUSION ADD-ON: CPT

## 2018-02-18 PROCEDURE — 93005 ELECTROCARDIOGRAM TRACING: CPT

## 2018-02-18 PROCEDURE — 80076 HEPATIC FUNCTION PANEL: CPT

## 2018-02-18 PROCEDURE — 85025 COMPLETE CBC W/AUTO DIFF WBC: CPT

## 2018-02-18 PROCEDURE — 36415 COLL VENOUS BLD VENIPUNCTURE: CPT

## 2018-02-18 NOTE — RAD
CT abdomen and pelvis with contrast  

 

History: Vaginal bleeding after hysterectomy, abdominal pain

 

Technique:  After the administration of intravenous contrast, CT imaging 

was performed of the abdomen and pelvis. No oral contrast was given as per

request. Multiplanar images are reviewed.  Exposure: One or more of the 

following individualized dose reduction techniques were utilized for this 

examination:  1. Automated exposure control  2. Adjustment of the mA 

and/or kV according to patient size  3. Use of iterative reconstruction 

technique.

 

Contrast:  75 cc Omnipaque 300

 

Comparison:  January 25, 2018

 

Findings:

There is no significant abnormality of the visualized lung bases.  There 

is suboptimal contrast opacification of the abdominal visceral organs, 

reported leak during injection of contrast There is no significant 

abnormality of the liver, spleen, pancreas, adrenal glands. Both kidneys 

enhance without hydronephrosis. Gallbladder is present, contracted 

appearance.

 

Accurate evaluation of bowel is limited without oral contrast. There is no

significant inflammatory change adjacent to the bowel. There is no 

evidence of bowel obstruction, free fluid, or free air. Appendix is not 

clearly identified if still present. Previously seen foci of free air have

resolved. There again has been hysterectomy. There is decreased in hazy 

and strandy change about the vaginal cuff, some residual mild fullness of 

the vaginal cuff. Previously seen free fluid in the dependent right pelvis

has resolved. Previously suggested right adnexal cyst is not seen. There 

is mild nonspecific circumferential prominence of the urinary bladder 

walls although stable.

 

Impression:

1.  There again has been hysterectomy. There is some residual fullness of 

the vaginal cuff although stable to decreased, decreased adjacent strandy 

and hazy change, no new fluid collection of the abdomen or pelvis.

 

 

Electronically signed by: Ashish Baker MD (2/18/2018 7:49 PM) [facility]

## 2018-02-18 NOTE — PHYS DOC
Past History


Past Medical History:  Other


Additional Past Medical Histor:  Crohn's disease


Past Surgical History:  , Hysterectomy, Other


Additional Past Surgical Histo:  colonoscopy, expiratory laparotomy


Smoking:  Non-smoker


Alcohol Use:  None


Drug Use:  None





Adult General


Chief Complaint


Chief Complaint:  VAGINAL BLEEDING





HPI


HPI





sHe is a pleasant 26-year-old female with a recent history of hysterectomy 

about 4 weeks ago, prior history of Crohn's disease, exploratory laparotomy, 

prior colonoscopy for rectal bleeding presents with vaginal bleeding that began 

about 3 hours prior to arrival. She is a pleasant 26-year-old female who 

recently had a hysterectomy and was told by her GYN to return immediately if 

she had any significant bright red blood per vagina. Today while walking around 

[facility] she felt a gush of fluid from her vagina looks down and she was bleeding 

into her pants and down her legs. She admits she's had slight lower abdominal 

pain prior to this event with subjective fevers and chills last several days. 

Patient denies any UTI symptoms, denies any nausea, vomiting, diarrhea but has 

had a little bit of dizziness with standing quickly secondary what she believes 

to be a lot of blood loss. Patient denies any back pain, denies any focal 

neurologic deficit otherwise, denies any trauma to her pelvis or lower abdomen. 

Patient denies any rash, swelling, travel outside the country or antibiotic. 

Patient says the pain is increased with range of motion and direct pressure 

lower abdomen.





Review of Systems


Review of Systems





Constitutional: Subjective fevers and chills


Eyes: Denies change in visual acuity, redness, or eye pain []


HENT: Denies nasal congestion or sore throat []


Respiratory: Denies cough or shortness of breath []


Cardiovascular: No additional information not addressed in HPI []


GI: Lower abdominal pain with nausea but no vomiting no bloody stools no 

diarrhea.[]


: Denies dysuria or hematuria []


Musculoskeletal: Denies back pain or joint pain []


Integument: Denies rash or skin lesions []


Neurologic: Patient does complain of lightheaded dizziness associated with her 

symptoms.]


Endocrine: Denies polyuria or polydipsia []





All other systems were reviewed and found to be within normal limits, except as 

documented in this note.





Current Medications


Current Medications





Current Medications








 Medications


  (Trade)  Dose


 Ordered  Sig/Sandy  Start Time


 Stop Time Status Last Admin


Dose Admin


 


 Hydromorphone HCl


  (Dilaudid)  1 mg  1X  ONCE  18 19:00


 2 19:01   


 


 


 Info


  (Do NOT chart on


 this entry -- for


 MONITORING)  1 each  PRN DAILY  PRN  18 19:00


 2 18:59   


 


 


 Iohexol


  (Omnipaque 300


 Mg/ml)  75 ml  1X  ONCE  18 19:00


 18 19:01   


 


 


 Lorazepam


  (Ativan)  1 mg  1X  ONCE  18 19:00


 18 19:01   


 


 


 Ondansetron HCl


  (Zofran)  4 mg  1X  ONCE  18 19:00


 18 19:01   


 


 


 Sodium Chloride


  (Normal Saline


 Flush)  10 ml  QSHIFT  PRN  18 18:45


     


 











Allergies


Allergies





Allergies








Coded Allergies Type Severity Reaction Last Updated Verified


 


  amoxicillin Allergy Unknown  18 Yes


 


  peanut Allergy Unknown  18 Yes


 


  shrimp Allergy Unknown  18 Yes











Physical Exam


Physical Exam


Of the vital signs recorded on the chart within normal limits


Constitutional: Well developed, well nourished, no acute distress, non-toxic 

appearance. []


HENT: Normocephalic, atraumatic, bilateral external ears normal, oropharynx 

moist no erythema, no oral exudates, nose normal. []


Eyes: PERRLA, EOMI, conjunctiva normal, no discharge. [] 


Neck: Normal range of motion, no tenderness, supple, no stridor. [] 


Cardiovascular:Heart rate regular rhythm, no murmur []


Lungs & Thorax:  Bilateral breath sounds clear to auscultation []


Abdomen: Significant tenderness to palpation around the umbilicus in the lower 

suprapubic region. Patient has some voluntary guarding but rebound or 

organomegaly. There is no Martin's or McBurney's point tenderness palpation


 exam: Patient's normal external female genitalia look normal with no obvious 

lesions there is some bleeding that is noted with clots inside the vaginal 

vault. The cuff does look intact. Patient had a very difficult time tolerating 

the procedure and some to remove clot using Dodson swabs to visualize the entire 

cuff


Skin: Warm, dry, no erythema, no rash. [] 


Back: No tenderness, no CVA tenderness. [] 


Extremities: No tenderness, no cyanosis, no clubbing, ROM intact, no edema. [] 


Neurologic: Alert and oriented X 3, normal motor function, normal sensory 

function, no focal deficits noted. []


Psychologic: She is very anxious but judgment is intact and normal.





Current Patient Data


Vital Signs





 Vital Signs








  Date Time  Temp Pulse Resp B/P (MAP) Pulse Ox O2 Delivery O2 Flow Rate FiO2


 


18 18:00 98.4 75 16  98 Room Air  











EKG


EKG


[]





Radiology/Procedures


Radiology/Procedures


[] [phone]


 


 IMAGING REPORT





 Signed





PATIENT: FRANCESCA MCBRIDE ACCOUNT: [id number] MRN#: [id number]


: 1990 LOCATION: ER AGE: 27


SEX: F EXAM DT: 18 ACCESSION#: [id number]


STATUS: REG ER ORD. PHYSICIAN: NAS RIVAS MD 


REASON: post operative bleeding


PROCEDURE: CT ABD PELV W/ IV CONTRST ONLY





 


CT abdomen and pelvis with contrast  


 


History: Vaginal bleeding after hysterectomy, abdominal pain


 


Technique:  After the administration of intravenous contrast, CT imaging 


was performed of the abdomen and pelvis. No oral contrast was given as per


request. Multiplanar images are reviewed.  Exposure: One or more of the 


following individualized dose reduction techniques were utilized for this 


examination:  1. Automated exposure control  2. Adjustment of the mA 


and/or kV according to patient size  3. Use of iterative reconstruction 


technique.


 


Contrast:  75 cc Omnipaque 300


 


Comparison:  2018


 


Findings:


There is no significant abnormality of the visualized lung bases.  There 


is suboptimal contrast opacification of the abdominal visceral organs, 


reported leak during injection of contrast There is no significant 


abnormality of the liver, spleen, pancreas, adrenal glands. Both kidneys 


enhance without hydronephrosis. Gallbladder is present, contracted 


appearance.


 


Accurate evaluation of bowel is limited without oral contrast. There is no


significant inflammatory change adjacent to the bowel. There is no 


evidence of bowel obstruction, free fluid, or free air. Appendix is not 


clearly identified if still present. Previously seen foci of free air have


resolved. There again has been hysterectomy. There is decreased in hazy 


and strandy change about the vaginal cuff, some residual mild fullness of 


the vaginal cuff. Previously seen free fluid in the dependent right pelvis


has resolved. Previously suggested right adnexal cyst is not seen. There 


is mild nonspecific circumferential prominence of the urinary bladder 


walls although stable.


 


Impression:


1.  There again has been hysterectomy. There is some residual fullness of 


the vaginal cuff although stable to decreased, decreased adjacent strandy 


and hazy change, no new fluid collection of the abdomen or pelvis.


 


 


Electronically signed by: Kristian Loaiza MD (2018 7:49 PM) [facility]














DICTATED AND SIGNED BY:     KRISTIAN LOAIZA MD


DATE:     18





CC: NAS RIVAS MD; JENNI MARINO MD ~





Course & Med Decision Making


Course & Med Decision Making


Patient presents with vaginal bleeding 4 weeks after a total abdominal 

hysterectomy. She admits she's had a little abdominal discomfort prior to the 

gush of blood she expresses today. She experienced some dizziness as well with 

these symptoms. Differential diagnosis for her suprapubic Right lower quadrant 

abdominal pain





Acute pancreatitis.


Appendicitis.


Acute hepatitis.


Peptic ulcer disease.


Nonulcer dyspepsia.


Irritable bowel disease.


Functional gallbladder disorder.


Sphincter of Oddi dysfunction.


Diseases of the right kidney.


Right-sided pneumonia.


Mzfw-Tjea-Ztlinp syndrome


Subhepatic or intraabdominal abscess.


Perforated viscus.


Cardiac ischemia.


Black  spider envenomation UTI, pyonephritis, kidney stone, abdominal 

aneurysm,


Cholecystitis


Cholelithiasis


Ascending cholangitis


Postoperative fistula, postoperative hematoma, postoperative bleeding abscess





 Pertinent Labs and Imaging studies reviewed. (See chart for details)











[]


 Laboratory Tests








Test


  18


18:00


 


White Blood Count


  6.3 x10^3/uL


(4.0-11.0)


 


Red Blood Count


  4.51 x10^6/uL


(3.50-5.40)


 


Hemoglobin


  12.0 g/dL


(12.0-15.5)


 


Hematocrit


  36.7 %


(36.0-47.0)


 


Mean Corpuscular Volume


  81 fL ()


 


 


Mean Corpuscular Hemoglobin 27 pg (25-35)  


 


Mean Corpuscular Hemoglobin


Concent 33 g/dL


(31-37)


 


Red Cell Distribution Width


  14.0 %


(11.5-14.5)


 


Platelet Count


  215 x10^3/uL


(140-400)


 


Neutrophils (%) (Auto) 53 % (31-73)  


 


Lymphocytes (%) (Auto) 32 % (24-48)  


 


Monocytes (%) (Auto) 9 % (0-9)  


 


Eosinophils (%) (Auto) 5 % (0-3)  H


 


Basophils (%) (Auto) 1 % (0-3)  


 


Neutrophils # (Auto)


  3.3 x10^3uL


(1.8-7.7)


 


Lymphocytes # (Auto)


  2.0 x10^3/uL


(1.0-4.8)


 


Monocytes # (Auto)


  0.6 x10^3/uL


(0.0-1.1)


 


Eosinophils # (Auto)


  0.3 x10^3/uL


(0.0-0.7)


 


Basophils # (Auto)


  0.1 x10^3/uL


(0.0-0.2)


 


Urine Collection Type Unknown  


 


Urine Color Yellow  


 


Urine Clarity Clear  


 


Urine pH 6.0  


 


Urine Specific Gravity 1.020  


 


Urine Protein


  Neg


(NEG-TRACE)


 


Urine Glucose (UA)


  Neg mg/dL


(NEG)


 


Urine Ketones (Stick)


  Trace mg/dL


(NEG)


 


Urine Blood Mod (NEG)  


 


Urine Nitrite Neg (NEG)  


 


Urine Bilirubin Neg (NEG)  


 


Urine Urobilinogen Dipstick


  0.2 mg/dL (0.2


mg/dL)


 


Urine Leukocyte Esterase Neg (NEG)  


 


Urine RBC


  3-5 /HPF (0-2)


 


 


Urine WBC


  1-4 /HPF (0-4)


 


 


Urine Squamous Epithelial


Cells Occ /LPF  


 


 


Urine Bacteria


  0 /HPF (0-FEW)


 


 


Urine Mucus Mod /LPF  


 


Sodium Level


  140 mmol/L


(136-145)


 


Potassium Level


  3.5 mmol/L


(3.5-5.1)


 


Chloride Level


  104 mmol/L


()


 


Carbon Dioxide Level


  28 mmol/L


(21-32)


 


Anion Gap 8 (6-14)  


 


Blood Urea Nitrogen


  10 mg/dL


(7-20)


 


Creatinine


  0.7 mg/dL


(0.6-1.0)


 


Estimated GFR


(Cockcroft-Gault) 98.9  


 


 


Glucose Level


  112 mg/dL


(70-99)  H


 


Calcium Level


  8.5 mg/dL


(8.5-10.1)


 


Total Bilirubin


  0.2 mg/dL


(0.2-1.0)


 


Direct Bilirubin


  < 0.1 mg/dL


(0.0-0.2)


 


Aspartate Amino Transferase


(AST) 18 U/L (15-37)


 


 


Alanine Aminotransferase (ALT)


  18 U/L (14-59)


 


 


Alkaline Phosphatase


  63 U/L


()


 


Total Protein


  7.5 g/dL


(6.4-8.2)


 


Albumin


  3.6 g/dL


(3.4-5.0)





Impression presented with postoperative vaginal bleeding. Although patient is 

not actively bleeding at this time patient's H&H is stable, patient's CMP is 

unremarkable. Patient urinalysis does not show signs of infection although 

there is some bleeding noted with red blood cells on the dip. Patient's CT scan 

shows an improving area of fluid collection around the cough of the post 

hysterectomy site. There is no inflammation around the appendix. Patient has no 

obvious signs of abscess or postoperative inflammation. Although the patient is 

uncomfortable I do not suspect a postoperative abscess. Given the drainage that 

I see I believe this is postoperative see her sinus drainage.. Patient is a 

white count I think she is safe for discharge to follow-up with her primary 

care he GYN tomorrow.


Impression: Postoperative pain postoperative vaginal bleeding.


discharge:





I've spoken with the patient and/or caregivers. I've explained the patient's 

condition, diagnosis and treatment plan based on information available to me at 

this time. I've answered the patient's and/or caregivers questions and 

addressed any concerns. The patient and/or caregivers have a good understanding 

the patient's diagnosis, condition and treatment plan as can be expected at 

this point. Vital signs have been stabilized. The patient's condition is stable 

for discharge from the emergency department.





The patient will pursue further outpatient evaluation with her primary care 

provider or other designated consulting physician as outlined in the discharge 

instructions. Patient and/or caregivers are agreeable to this plan of care and 

follow-up instructions have been explained in detail. The patient and/or 

caregivers have received these instructions in written format and expressed 

understanding of these discharge instructions. The patient and her caregivers 

are aware that if any significant change in condition or worsening of symptoms 

should prompt him to immediately return to this of the closest emergency 

department.  If an emergent department is not readily available I would 

encourage him to call 911.





Yosef Disclaimer


Dragon Disclaimer


This electronic medical record was generated, in whole or in part, using a 

voice recognition dictation system.





Departure


Departure:


Impression:  


 Primary Impression:  


 Abdominal pain


 Additional Impression:  


 Postoperative vaginal bleeding


Disposition:   HOME, SELF-CARE


Condition:  STABLE


Referrals:  


JENNI MARINO MD (PCP)


Patient Instructions:  Abdominal Pain, Pain Relief Preoperatively and 

Postoperatively, Postsurgical Bleeding





Additional Instructions:  


discharge:





I've spoken with the patient and/or caregivers. I've explained the patient's 

condition, diagnosis and treatment plan based on information available to me at 

this time. I've answered the patient's and/or caregivers questions and 

addressed any concerns. The patient and/or caregivers have a good understanding 

the patient's diagnosis, condition and treatment plan as can be expected at 

this point. Vital signs have been stabilized. The patient's condition is stable 

for discharge from the emergency department.





The patient will pursue further outpatient evaluation with her primary care 

provider or other designated consulting physician as outlined in the discharge 

instructions. Patient and/or caregivers are agreeable to this plan of care and 

follow-up instructions have been explained in detail. The patient and/or 

caregivers have received these instructions in written format and expressed 

understanding of these discharge instructions. The patient and her caregivers 

are aware that if any significant change in condition or worsening of symptoms 

should prompt him to immediately return to this of the closest emergency 

department.  If an emergent department is not readily available I would 

encourage him to call 911.


Scripts


Naproxen Sodium (NAPROXEN SODIUM) 275 Mg Tablet


275 MG PO BID for 7 Days, #14 TAB


   Prov: NAS RIVAS MD         18 


Hydrocodone Bit/Acetaminophen (HYDROCODONE-APAP 5-325  **) 1 Each Tablet


1 TAB PO PRN Q6HRS Y for PAIN for 3 Days, #10 TAB 0 Refills


   Prov: NAS RIVAS MD         18





Problem Qualifiers











NAS RIVAS MD 2018 19:12

## 2018-02-18 NOTE — EKG
[facility] [address], [place] [address]

Test Date:    2018               Test Time:    18:56:00

Pat Name:     FRANCESCA MCBRIDE           Department:   

Patient ID:   [id number]           Room:          

Gender:       F                        Technician:   WALTER

:          1990               Requested By: NAS RIVAS

Order Number: [id number]            Reading MD:   Javi Grady

                                 Measurements

Intervals                              Axis          

Rate:         76                       P:            83

GA:           166                      QRS:          55

QRSD:         80                       T:            47

QT:           384                                    

QTc:          436                                    

                           Interpretive Statements

SINUS RHYTHM

NONSPECIFIC ST-T WAVE CHANGES.

RI6.01

No previous ECG available for comparison



Electronically Signed On 2018 10:00:32 CST by Javi Grady

## 2018-02-22 ENCOUNTER — HOSPITAL ENCOUNTER (EMERGENCY)
Dept: HOSPITAL 63 - ER | Age: 28
LOS: 1 days | Discharge: HOME | End: 2018-02-23
Payer: COMMERCIAL

## 2018-02-22 VITALS — SYSTOLIC BLOOD PRESSURE: 108 MMHG | DIASTOLIC BLOOD PRESSURE: 66 MMHG

## 2018-02-22 VITALS — BODY MASS INDEX: 23.22 KG/M2 | WEIGHT: 153.22 LBS | HEIGHT: 68 IN

## 2018-02-22 DIAGNOSIS — Z88.1: ICD-10-CM

## 2018-02-22 DIAGNOSIS — Z98.890: ICD-10-CM

## 2018-02-22 DIAGNOSIS — Z91.010: ICD-10-CM

## 2018-02-22 DIAGNOSIS — Z90.710: ICD-10-CM

## 2018-02-22 DIAGNOSIS — K62.5: ICD-10-CM

## 2018-02-22 DIAGNOSIS — N99.820: Primary | ICD-10-CM

## 2018-02-22 DIAGNOSIS — Z91.013: ICD-10-CM

## 2018-02-22 DIAGNOSIS — N93.9: ICD-10-CM

## 2018-02-22 LAB
ALBUMIN SERPL-MCNC: 3.7 G/DL (ref 3.4–5)
ALP SERPL-CCNC: 64 U/L (ref 46–116)
ALT SERPL-CCNC: 17 U/L (ref 14–59)
AMPHETAMINE/METHAMPHETAMINE: (no result)
ANION GAP SERPL CALC-SCNC: 8 MMOL/L (ref 6–14)
APTT PPP: (no result) S
AST SERPL-CCNC: 22 U/L (ref 15–37)
BACTERIA #/AREA URNS HPF: 0 /HPF
BARBITURATES UR-MCNC: (no result) UG/ML
BASOPHILS # BLD AUTO: 0.1 X10^3/UL (ref 0–0.2)
BASOPHILS NFR BLD: 1 % (ref 0–3)
BENZODIAZ UR-MCNC: (no result) UG/L
BILIRUB DIRECT SERPL-MCNC: < 0.1 MG/DL (ref 0–0.2)
BILIRUB SERPL-MCNC: 0.2 MG/DL (ref 0.2–1)
BILIRUB UR QL STRIP: (no result)
CA-I SERPL ISE-MCNC: 10 MG/DL (ref 7–20)
CALCIUM SERPL-MCNC: 8.7 MG/DL (ref 8.5–10.1)
CANNABINOIDS UR-MCNC: (no result) UG/L
CHLORIDE SERPL-SCNC: 102 MMOL/L (ref 98–107)
CO2 SERPL-SCNC: 31 MMOL/L (ref 21–32)
COCAINE UR-MCNC: (no result) NG/ML
CREAT SERPL-MCNC: 0.7 MG/DL (ref 0.6–1)
EOSINOPHIL NFR BLD: 0.3 X10^3/UL (ref 0–0.7)
EOSINOPHIL NFR BLD: 5 % (ref 0–3)
ERYTHROCYTE [DISTWIDTH] IN BLOOD BY AUTOMATED COUNT: 14.1 % (ref 11.5–14.5)
FIBRINOGEN PPP-MCNC: CLEAR MG/DL
GFR SERPLBLD BASED ON 1.73 SQ M-ARVRAT: 98.9 ML/MIN
GLUCOSE SERPL-MCNC: 95 MG/DL (ref 70–99)
GLUCOSE UR STRIP-MCNC: (no result) MG/DL
HCT VFR BLD CALC: 37.3 % (ref 36–47)
HGB BLD-MCNC: 12.3 G/DL (ref 12–15.5)
LIPASE: 83 U/L (ref 73–393)
LYMPHOCYTES # BLD: 2.3 X10^3/UL (ref 1–4.8)
LYMPHOCYTES NFR BLD AUTO: 32 % (ref 24–48)
MCH RBC QN AUTO: 27 PG (ref 25–35)
MCHC RBC AUTO-ENTMCNC: 33 G/DL (ref 31–37)
MCV RBC AUTO: 81 FL (ref 79–100)
METHADONE SERPL-MCNC: (no result) NG/ML
MONO #: 0.6 X10^3/UL (ref 0–1.1)
MONOCYTES NFR BLD: 9 % (ref 0–9)
NEUT #: 3.9 X10^3UL (ref 1.8–7.7)
NEUTROPHILS NFR BLD AUTO: 54 % (ref 31–73)
NITRITE UR QL STRIP: (no result)
OPIATES UR-MCNC: (no result) NG/ML
PCP SERPL-MCNC: (no result) MG/DL
PLATELET # BLD AUTO: 203 X10^3/UL (ref 140–400)
POTASSIUM SERPL-SCNC: 3.5 MMOL/L (ref 3.5–5.1)
PROT SERPL-MCNC: 7.5 G/DL (ref 6.4–8.2)
RBC # BLD AUTO: 4.63 X10^6/UL (ref 3.5–5.4)
RBC #/AREA URNS HPF: (no result) /HPF (ref 0–2)
SODIUM SERPL-SCNC: 141 MMOL/L (ref 136–145)
SP GR UR STRIP: 1.01
SQUAMOUS #/AREA URNS LPF: (no result) /LPF
UROBILINOGEN UR-MCNC: 0.2 MG/DL
WBC # BLD AUTO: 7.2 X10^3/UL (ref 4–11)
WBC #/AREA URNS HPF: (no result) /HPF (ref 0–4)

## 2018-02-22 PROCEDURE — G0479 DRUG TEST PRESUMP NOT OPT: HCPCS

## 2018-02-22 PROCEDURE — 96367 TX/PROPH/DG ADDL SEQ IV INF: CPT

## 2018-02-22 PROCEDURE — 86850 RBC ANTIBODY SCREEN: CPT

## 2018-02-22 PROCEDURE — 74177 CT ABD & PELVIS W/CONTRAST: CPT

## 2018-02-22 PROCEDURE — 87591 N.GONORRHOEAE DNA AMP PROB: CPT

## 2018-02-22 PROCEDURE — 80048 BASIC METABOLIC PNL TOTAL CA: CPT

## 2018-02-22 PROCEDURE — 81001 URINALYSIS AUTO W/SCOPE: CPT

## 2018-02-22 PROCEDURE — 86900 BLOOD TYPING SEROLOGIC ABO: CPT

## 2018-02-22 PROCEDURE — 86901 BLOOD TYPING SEROLOGIC RH(D): CPT

## 2018-02-22 PROCEDURE — 87491 CHLMYD TRACH DNA AMP PROBE: CPT

## 2018-02-22 PROCEDURE — 85610 PROTHROMBIN TIME: CPT

## 2018-02-22 PROCEDURE — 84702 CHORIONIC GONADOTROPIN TEST: CPT

## 2018-02-22 PROCEDURE — 74022 RADEX COMPL AQT ABD SERIES: CPT

## 2018-02-22 PROCEDURE — 96365 THER/PROPH/DIAG IV INF INIT: CPT

## 2018-02-22 PROCEDURE — 80307 DRUG TEST PRSMV CHEM ANLYZR: CPT

## 2018-02-22 PROCEDURE — 36415 COLL VENOUS BLD VENIPUNCTURE: CPT

## 2018-02-22 PROCEDURE — 83690 ASSAY OF LIPASE: CPT

## 2018-02-22 PROCEDURE — 76856 US EXAM PELVIC COMPLETE: CPT

## 2018-02-22 PROCEDURE — 96375 TX/PRO/DX INJ NEW DRUG ADDON: CPT

## 2018-02-22 PROCEDURE — 96372 THER/PROPH/DIAG INJ SC/IM: CPT

## 2018-02-22 PROCEDURE — 85651 RBC SED RATE NONAUTOMATED: CPT

## 2018-02-22 PROCEDURE — 96361 HYDRATE IV INFUSION ADD-ON: CPT

## 2018-02-22 PROCEDURE — 85730 THROMBOPLASTIN TIME PARTIAL: CPT

## 2018-02-22 PROCEDURE — 99285 EMERGENCY DEPT VISIT HI MDM: CPT

## 2018-02-22 PROCEDURE — S0028 INJECTION, FAMOTIDINE, 20 MG: HCPCS

## 2018-02-22 PROCEDURE — 80076 HEPATIC FUNCTION PANEL: CPT

## 2018-02-22 PROCEDURE — 85025 COMPLETE CBC W/AUTO DIFF WBC: CPT

## 2018-02-22 PROCEDURE — 96366 THER/PROPH/DIAG IV INF ADDON: CPT

## 2018-02-22 NOTE — RAD
EXAM: Pelvic sonogram.

 

HISTORY: Vaginal bleeding.

 

TECHNIQUE: Transabdominal sonographic imaging of the pelvis was performed.

 

COMPARISON: 11/25/2017.

 

FINDINGS: The uterus is surgically absent. The left ovary is not seen. The

right ovary is normal in size and demonstrate normal blood flow. There is 

no free fluid. There is no abnormality adjacent to the vaginal cuff.

 

IMPRESSION:

1. Surgically absent uterus and nonvisualization of the left ovary.

2. Unremarkable right ovary.

3. No finding to correlate with reported vaginal bleeding.

 

Electronically signed by: Pati Quintanilla MD (2/22/2018 8:43 PM) [facility]

## 2018-02-22 NOTE — RAD
EXAM: Abdomen and pelvis CT with intravenous contrast.

 

HISTORY: Pain. Vaginal and rectal bleeding.

 

TECHNIQUE: Computed tomographic images of the abdomen and pelvis were 

obtained following the administration of 75 cc Omnipaque 300 intravenous 

contrast. Multiplanar reformatting was performed. 

*One or more of the following individualized dose reduction techniques 

were utilized for this examination:  

1. Automated exposure control.  

2. Adjustment of the mA and/or kV according to patient size.  

3. Use of iterative reconstruction technique.

 

COMPARISON: 2/18/2018.

 

FINDINGS: Evaluation of the lower thorax demonstrates minimal basilar 

atelectasis. There is no infiltrate or effusion. There is slight fatty 

infiltration of the liver along the falciform ligament. The gallbladder, 

pancreas, spleen, adrenal glands and stomach are unremarkable. There are 

extrarenal pelves, an incidental finding. There is no obstructive 

uropathy.

 

The appendix is normal in appearance. The cecum is positioned within the 

right midabdomen, a normal variant. No abnormally thickened or dilated 

loop of bowel is seen. There is a small nodule inferior to the spleen due 

to a splenule. The urinary bladder is unremarkable. 

 

There are multiple ovarian follicles with a dominant left ovarian 

follicular cyst measuring 1.6 cm. There is trace pelvic free fluid. There 

is no lymphadenopathy. No suspicious osseous lesion is seen. There is 

subchondral sclerosis involving the sacroiliac joints. 

 

IMPRESSION: 

1. No acute abdominal or pelvic finding. There are multiple ovarian 

follicles with a suspected dominant physiologic left ovarian follicular 

cyst measuring 1.6 cm. The uterus is surgically absent. There is stable 

fullness of the vaginal cuff due to recent hysterectomy. No abscess is 

seen.

2. Otherwise, relatively unremarkable abdomen and pelvis CT.

 

Electronically signed by: Pati Quintanilla MD (2/22/2018 11:06 PM) [facility]

## 2018-02-22 NOTE — ED.ADGEN
Past History


Past Medical History:  Other


Additional Past Medical Histor:  Crohn's disease


Past Surgical History:  , Hysterectomy, Other


Additional Past Surgical Histo:  colonoscopy, expiratory laparotomy


Smoking:  Non-smoker


Alcohol Use:  None


Drug Use:  None





Adult General


Chief Complaint


Chief Complaint


" ... I still bleeding and cramping... I even had blood... when I went to the 

[facility].. See these pictures... I was here on .. and my Ob said just go 

back to ED..."





HPI


HPI





Patient is a 27 year old female who presents with above hx and complaints of 

vaginal and rectal bleeding.  Patient had a hysterectomy approximately 5 weeks 

ago at the [facility] by Dr. Jolynn Kern.  Pt. did have some rectal bleeding 

after surgery, but it resolved.  .  Since that time she been seen twice in the 

emergency room one on  for post surgery pain 5 days after hysterectomy. 

Again on  for vaginal bleeding.        Pt. hx of Crohn's and last 

colonoscopic exam was in .  Patient has not taking Crohn's suppressive meds 

since .  Patient's Crohn's is exacerbated by NSAIDs.  Has had occasional 

Crohn's flares which been treated with steroids prescribed in  emergency 

department visits.   


Pt denies any trauma, no history of travel or specific ill contacts. Patient 

has  history Thyroid disease . Has had prior  as laparoscopies exams for 

abdomen pain.   Has previously had an .  No history of coagulopathy 

with  her or family members.  Pt. has reported not taken and pain meds since 

surgery 5 weeks ago.





Review of Systems


Review of Systems





Constitutional: Denies fever or chills []


Eyes: Denies change in visual acuity, redness, or eye pain []


HENT: Denies nasal congestion or sore throat []


Respiratory: Denies cough or shortness of breath []


Cardiovascular: No additional information not addressed in HPI []


GI: Complaints of  abdominal pain, nausea,. Rectal and vaginal bleeding. Denies 

vomiting.  Complaints of bloody stools. 


: Denies dysuria or hematuria []


Musculoskeletal: Denies back pain or joint pain []


Integument: Denies rash or skin lesions []


Neurologic: Denies headache, focal weakness or sensory changes []


Endocrine: Denies polyuria or polydipsia []





All other systems were reviewed and found to be within normal limits, except as 

documented in this note.





Family History


Family History


Noncontributory to presentation





Current Medications


Current Medications





Current Medications








 Medications


  (Trade)  Dose


 Ordered  Sig/Sandy  Start Time


 Stop Time Status Last Admin


Dose Admin


 


 Ciprofloxacin


  (Cipro)  500 mg  1X  ONCE  18 01:00


 18 01:01 DC 18 00:53


500 MG


 


 Diphenhydramine


 HCl


  (Benadryl)  50 mg  1X  ONCE  18 22:15


 18 22:17 DC 18 22:33


50 MG


 


 Famotidine


  (Pepcid Vial)  20 mg  1X  ONCE  18 20:00


 18 20:09 DC 18 20:31


20 MG


 


 Folic Acid


  (FOLIC ACID


 SYRINGE for ER)  5 mg  STK-MED ONCE  18 22:40


 18 22:41 DC  


 


 


 Iohexol


  (Omnipaque 240


 Mg/ml)  50 ml  1X  ONCE  18 22:00


 18 22:03 DC 18 22:00


50 ML


 


 Iohexol


  (Omnipaque 300


 Mg/ml)  75 ml  1X  ONCE  18 22:00


 18 22:03 DC 18 22:44


75 ML


 


 Methylprednisolone


 Sodium Succinate


  (SOLU-Medrol


 125MG VIAL)  125 mg  1X  ONCE  18 22:15


 18 22:17 DC 18 22:33


125 MG


 


 Metronidazole  100 ml @ 


 100 mls/hr  1X  ONCE  18 01:00


 18 02:00 DC 18 00:53


100 MLS/HR


 


 Morphine Sulfate


  (Morphine 10mg


 Syringe)  10 mg  1X  ONCE  18 01:00


 18 01:01 DC 18 00:54


10 MG


 


 Multivitamins/


 Minerals


  (Infuvite Adult)  10 ml  STK-MED ONCE  18 22:40


 18 22:41 DC  


 


 


 Multivitamins/


 Minerals 10 ml/


 Folic Acid 1 mg/


 Thiamine HCl 100


 mg/Lactated


 Ringer's  1,011.1 ml


  @ 1,000 mls/


 hr  1X  ONCE  18 22:30


 18 23:30 DC 18 22:43


1,000 MLS/HR


 


 Ondansetron HCl


  (Zofran)  8 mg  1X  ONCE  18 20:00


 18 20:08 DC 18 20:31


8 MG


 


 Phytonadione


  (Vitamin K)  10 mg  STK-MED ONCE  18 22:39


 18 22:40 DC  


 


 


 Sodium Chloride  1,000 ml @ 


 1,000 mls/hr  Q1H  18 20:00


 18 20:59 DC 18 20:31


1,000 MLS/HR


 


 Thiamine HCl  200 mg  STK-MED ONCE  18 22:39


 18 22:40 DC  


 





See nursing for home medications





Allergies


Allergies





Allergies








Coded Allergies Type Severity Reaction Last Updated Verified


 


  amoxicillin Allergy Unknown  18 Yes


 


  peanut Allergy Unknown  18 Yes


 


  shrimp Allergy Unknown  18 Yes











Physical Exam


Physical Exam





Constitutional: Well developed, well nourished, moderately acute distress, non-

toxic appearance. []


HENT: Normocephalic, atraumatic, bilateral external ears normal, oropharynx 

moist, no oral exudates, nose normal. []


Eyes: PERRLA, EOMI, conjunctiva normal, no discharge. [] 


Neck: Normal range of motion, no tenderness, supple, no stridor. [] 


Cardiovascular:Heart rate regular rhythm, no murmur []


Lungs & Thorax:  Bilateral breath sounds equal at apex on auscultation []


Abdomen: Bowel sounds normal, soft, pelvic tenderness, no masses, no pulsatile 

masses. Old surgery scars.  Pain localizes to right lower quadrant.  Rectal 

exam trace positive.  Vaginal exam shows some bleeding in the area of vaginal 

cuff.  Cuff appears to be intact. Do not appreciate any Crohn's fistula. 


Skin: Warm, dry, no erythema, no rash. [] 


Back: No tenderness, no CVA tenderness. [] 


Extremities: No tenderness, no cyanosis, no clubbing, ROM intact, no edema. 

Mild psoas and obturator sign


Neurologic: Alert and oriented X 3, normal motor function, normal sensory 

function, no focal deficits noted. []


Psychologic: Affect anxious, judgement normal, mood depressed.





Current Patient Data


Vital Signs





 Vital Signs








  Date Time  Temp Pulse Resp B/P (MAP) Pulse Ox O2 Delivery O2 Flow Rate FiO2


 


18 00:54   18  99 Room Air  


 


18 23:00  88  108/66 (80)    


 


18 19:59 97.4       








Lab Results





 Laboratory Tests








Test


  18


19:00 18


20:28


 


Erythrocyte Sedimentation Rate 13 (0-25)   


 


White Blood Count


  


  7.2 x10^3/uL


(4.0-11.0)


 


Red Blood Count


  


  4.63 x10^6/uL


(3.50-5.40)


 


Hemoglobin


  


  12.3 g/dL


(12.0-15.5)


 


Hematocrit


  


  37.3 %


(36.0-47.0)


 


Mean Corpuscular Volume


  


  81 fL ()


 


 


Mean Corpuscular Hemoglobin  27 pg (25-35)  


 


Mean Corpuscular Hemoglobin


Concent 


  33 g/dL


(31-37)


 


Red Cell Distribution Width


  


  14.1 %


(11.5-14.5)


 


Platelet Count


  


  203 x10^3/uL


(140-400)


 


Neutrophils (%) (Auto)  54 % (31-73)  


 


Lymphocytes (%) (Auto)  32 % (24-48)  


 


Monocytes (%) (Auto)  9 % (0-9)  


 


Eosinophils (%) (Auto)  5 % (0-3)  H


 


Basophils (%) (Auto)  1 % (0-3)  


 


Neutrophils # (Auto)


  


  3.9 x10^3uL


(1.8-7.7)


 


Lymphocytes # (Auto)


  


  2.3 x10^3/uL


(1.0-4.8)


 


Monocytes # (Auto)


  


  0.6 x10^3/uL


(0.0-1.1)


 


Eosinophils # (Auto)


  


  0.3 x10^3/uL


(0.0-0.7)


 


Basophils # (Auto)


  


  0.1 x10^3/uL


(0.0-0.2)


 


Prothrombin Time


  


  10.9 SEC


(9.4-11.4)


 


Prothrombin Time INR  1.1 (0.9-1.1)  


 


PTT


  


  26 SEC (23-33)


 


 


Urine Collection Type  Unknown  


 


Urine Color  Straw  


 


Urine Clarity  Clear  


 


Urine pH  7.0  


 


Urine Specific Gravity  1.010  


 


Urine Protein


  


  Neg


(NEG-TRACE)


 


Urine Glucose (UA)


  


  Neg mg/dL


(NEG)


 


Urine Ketones (Stick)


  


  Neg mg/dL


(NEG)


 


Urine Blood  Mod (NEG)  


 


Urine Nitrite  Neg (NEG)  


 


Urine Bilirubin  Neg (NEG)  


 


Urine Urobilinogen Dipstick


  


  0.2 mg/dL (0.2


mg/dL)


 


Urine Leukocyte Esterase  Neg (NEG)  


 


Urine RBC


  


  1-2 /HPF (0-2)


 


 


Urine WBC


  


  Occ /HPF (0-4)


 


 


Urine Squamous Epithelial


Cells 


  Mod /LPF  


 


 


Urine Bacteria


  


  0 /HPF (0-FEW)


 


 


Maternal Serum HCG Beta


Subunit 


  < 1 mIU/mL


(0-6)


 


Sodium Level


  


  141 mmol/L


(136-145)


 


Potassium Level


  


  3.5 mmol/L


(3.5-5.1)


 


Chloride Level


  


  102 mmol/L


()


 


Carbon Dioxide Level


  


  31 mmol/L


(21-32)


 


Anion Gap  8 (6-14)  


 


Blood Urea Nitrogen


  


  10 mg/dL


(7-20)


 


Creatinine


  


  0.7 mg/dL


(0.6-1.0)


 


Estimated GFR


(Cockcroft-Gault) 


  98.9  


 


 


Glucose Level


  


  95 mg/dL


(70-99)


 


Calcium Level


  


  8.7 mg/dL


(8.5-10.1)


 


Total Bilirubin


  


  0.2 mg/dL


(0.2-1.0)


 


Direct Bilirubin


  


  < 0.1 mg/dL


(0.0-0.2)


 


Aspartate Amino Transferase


(AST) 


  22 U/L (15-37)


 


 


Alanine Aminotransferase (ALT)


  


  17 U/L (14-59)


 


 


Alkaline Phosphatase


  


  64 U/L


()


 


Total Protein


  


  7.5 g/dL


(6.4-8.2)


 


Albumin


  


  3.7 g/dL


(3.4-5.0)


 


Lipase


  


  83 U/L


()


 


Urine Opiates Screen  Neg (NEG)  


 


Urine Methadone Screen  Neg (NEG)  


 


Urine Barbiturates  Neg (NEG)  


 


Urine Phencyclidine Screen  Neg (NEG)  


 


Urine


Amphetamine/Methamphetamine 


  Neg (NEG)  


 


 


Urine Benzodiazepines Screen  Neg (NEG)  


 


Urine Cocaine Screen  Neg (NEG)  


 


Urine Cannabinoids Screen  Neg (NEG)  


 


Urine Ethyl Alcohol  Neg (NEG)  








Microbiology


18 Wet Prep - Final, Complete


          





Microbiology


18 Wet Prep - Final, Complete





EKG


EKG


[]





Radiology/Procedures


Radiology/Procedures


CT findings of abdomen reported no acute surgical findings. Vaginal cuff edema 

but appears to be intact. No large collections or abscesses.  No acute 

pathology noted or changes from prior CT.[]  See formal report when available.


Ultrasound shows no acute pathology. Ovarian cyst on left





Course & Med Decision Making


Course & Med Decision Making








Pertinent Labs and Imaging studies reviewed. (See chart for details)





Discussed presentation, testing and treatment plan with Dr. Norman- on call 

for Dr. Wiley.  Advised to have pt. call for an apt. this morning- for 

follow up on monday at the  clinic with Dr. Wiley. 





Patient to stay on a clear fluid diet. No solids or milk products 48 hours.  

Take Cipro 500 mg daily. Take Flagyl 500 mg 3 times a day. Take prednisolone 50 

mg a day for 5 days.  Start on Asacol / or equalivent 800mg 2 tablets x 3 times 

a day for the next 30 days.   Patient may take Zofran 8 mg tid for nausea and 

vomiting.  Take previous prescribed pain meds or Percocet as needed for pain.  

If continued vaginal bleeding follow-up and [facility].   Continue pad counts. 











[]





Final Impression


Final Impression


1. Vaginal bleeding post hysterectomy


2. History of Crohn's-suspect having a Crohn's flare.


3. Abdomen pain


4. Vaginal and rectal bleeding


Problems:  





Dragon Disclaimer


Dragon Disclaimer


This electronic medical record was generated, in whole or in part, using a 

voice recognition dictation system.











DACIA LAWSON MD 2018 19:44

## 2018-02-23 NOTE — RAD
Acute abdomen series with chest, 2/22/2018:



History: Abdominal pain



The abdominal gas pattern is unremarkable.  No free air seen in the abdomen. 

There is no evidence of organomegaly or abnormal abdominal calcification.



The heart size is normal.  The lungs are clear.



IMPRESSION: No acute abdominal abnormality is detected.

## 2018-03-03 ENCOUNTER — HOSPITAL ENCOUNTER (EMERGENCY)
Dept: HOSPITAL 63 - ER | Age: 28
Discharge: HOME | End: 2018-03-03
Payer: OTHER GOVERNMENT

## 2018-03-03 VITALS — HEIGHT: 68 IN | WEIGHT: 153 LBS | BODY MASS INDEX: 23.19 KG/M2

## 2018-03-03 VITALS — DIASTOLIC BLOOD PRESSURE: 53 MMHG | SYSTOLIC BLOOD PRESSURE: 109 MMHG

## 2018-03-03 DIAGNOSIS — R07.89: Primary | ICD-10-CM

## 2018-03-03 DIAGNOSIS — K50.90: ICD-10-CM

## 2018-03-03 DIAGNOSIS — F41.9: ICD-10-CM

## 2018-03-03 DIAGNOSIS — Z88.1: ICD-10-CM

## 2018-03-03 DIAGNOSIS — J45.909: ICD-10-CM

## 2018-03-03 DIAGNOSIS — Z91.013: ICD-10-CM

## 2018-03-03 DIAGNOSIS — Z91.010: ICD-10-CM

## 2018-03-03 LAB
ANION GAP SERPL CALC-SCNC: 10 MMOL/L (ref 6–14)
BASOPHILS # BLD AUTO: 0.1 X10^3/UL (ref 0–0.2)
BASOPHILS NFR BLD: 1 % (ref 0–3)
CA-I SERPL ISE-MCNC: 9 MG/DL (ref 7–20)
CALCIUM SERPL-MCNC: 8.6 MG/DL (ref 8.5–10.1)
CHLORIDE SERPL-SCNC: 102 MMOL/L (ref 98–107)
CO2 SERPL-SCNC: 24 MMOL/L (ref 21–32)
CREAT SERPL-MCNC: 0.7 MG/DL (ref 0.6–1)
EOSINOPHIL NFR BLD: 0.3 X10^3/UL (ref 0–0.7)
EOSINOPHIL NFR BLD: 3 % (ref 0–3)
ERYTHROCYTE [DISTWIDTH] IN BLOOD BY AUTOMATED COUNT: 14.3 % (ref 11.5–14.5)
GFR SERPLBLD BASED ON 1.73 SQ M-ARVRAT: 98.9 ML/MIN
GLUCOSE SERPL-MCNC: 81 MG/DL (ref 70–99)
HCT VFR BLD CALC: 38 % (ref 36–47)
HGB BLD-MCNC: 12.8 G/DL (ref 12–15.5)
LYMPHOCYTES # BLD: 3.2 X10^3/UL (ref 1–4.8)
LYMPHOCYTES NFR BLD AUTO: 31 % (ref 24–48)
MCH RBC QN AUTO: 27 PG (ref 25–35)
MCHC RBC AUTO-ENTMCNC: 34 G/DL (ref 31–37)
MCV RBC AUTO: 81 FL (ref 79–100)
MONO #: 1.1 X10^3/UL (ref 0–1.1)
MONOCYTES NFR BLD: 11 % (ref 0–9)
NEUT #: 5.5 X10^3UL (ref 1.8–7.7)
NEUTROPHILS NFR BLD AUTO: 54 % (ref 31–73)
PLATELET # BLD AUTO: 219 X10^3/UL (ref 140–400)
POTASSIUM SERPL-SCNC: 3.5 MMOL/L (ref 3.5–5.1)
RBC # BLD AUTO: 4.72 X10^6/UL (ref 3.5–5.4)
SODIUM SERPL-SCNC: 136 MMOL/L (ref 136–145)
WBC # BLD AUTO: 10.2 X10^3/UL (ref 4–11)

## 2018-03-03 PROCEDURE — 96374 THER/PROPH/DIAG INJ IV PUSH: CPT

## 2018-03-03 PROCEDURE — 36415 COLL VENOUS BLD VENIPUNCTURE: CPT

## 2018-03-03 PROCEDURE — 80048 BASIC METABOLIC PNL TOTAL CA: CPT

## 2018-03-03 PROCEDURE — 99285 EMERGENCY DEPT VISIT HI MDM: CPT

## 2018-03-03 PROCEDURE — 93005 ELECTROCARDIOGRAM TRACING: CPT

## 2018-03-03 PROCEDURE — 84484 ASSAY OF TROPONIN QUANT: CPT

## 2018-03-03 PROCEDURE — 85025 COMPLETE CBC W/AUTO DIFF WBC: CPT

## 2018-03-03 NOTE — PHYS DOC
Past History


Past Medical History:  Anxiety, Asthma


Additional Past Medical Histor:  Crohn's disease


Past Surgical History:  Hysterectomy


Additional Past Surgical Histo:  colonoscopy, expiratory laparotomy


Smoking:  Non-smoker


Alcohol Use:  None


Drug Use:  None





Adult General


Chief Complaint


Chief Complaint:  CHEST PAIN-NON CARDIAC NATURE





HPI


HPI





Patient is a 27 year old F who presents with constant dull, pressure-like 

central chest pain over the past 1 week.  Miozoty denies injury. She denies 

difficulty breathing. She does not feel that she has any associated symptoms. 

She has no exacerbating or alleviating factors. She is a nonsmoker who does not 

take any medications regularly. She also has no family history of heart disease.





Review of Systems


Review of Systems





Constitutional: Denies fever or chills []


Eyes: Denies change in visual acuity, redness, or eye pain []


HENT: Denies nasal congestion or sore throat []


Respiratory: Denies cough or shortness of breath []


Cardiovascular: No additional information not addressed in HPI []


GI: Denies abdominal pain, nausea, vomiting, bloody stools or diarrhea []


: Denies dysuria or hematuria []


Musculoskeletal: Denies back pain or joint pain []


Integument: Denies rash or skin lesions []


Neurologic: Denies headache, focal weakness or sensory changes []


Endocrine: Denies polyuria or polydipsia []





All other systems were reviewed and found to be within normal limits, except as 

documented in this note.





Family History


Family History


No pertinent family medical history was reported





Current Medications


Current Medications


No current medications


Current Medications








 Medications


  (Trade)  Dose


 Ordered  Sig/[facility]  Start Time


 Stop Time Status Last Admin


Dose Admin


 


 Ketorolac


 Tromethamine


  (Toradol)  15 mg  1X  ONCE  3/3/18 22:00


 3/3/18 22:01 DC 3/3/18 21:53


15 MG











Allergies


Allergies





Allergies








Coded Allergies Type Severity Reaction Last Updated Verified


 


  amoxicillin Allergy Unknown  2/22/18 Yes


 


  peanut Allergy Unknown  2/22/18 Yes


 


  shrimp Allergy Unknown  2/22/18 Yes











Physical Exam


Physical Exam





Constitutional: Well developed, well nourished, no acute distress, non-toxic 

appearance. []


HENT: Normocephalic, atraumatic, 


Eyes:  EOMI, conjunctiva normal, no discharge. [] 


Neck: Normal range of motion, no tenderness, supple, no stridor. [] 


Cardiovascular:Heart rate regular rhythm


Lungs & Thorax:  Bilateral breath sounds clear to auscultation []


Abdomen: Bowel sounds normal, soft, no tenderness, no masses, no pulsatile 

masses. [] 


Skin: Warm, dry, no erythema, no rash. [] 


Extremities: No tenderness, no cyanosis, no clubbing, ROM intact, no edema. [] 


Neurologic: Alert and oriented X 3, normal motor function, normal sensory 

function, no focal deficits noted. []


Psychologic: Affect normal, judgement normal, mood normal. []





Current Patient Data


Vital Signs





 Vital Signs








  Date Time  Temp Pulse Resp B/P (MAP) Pulse Ox O2 Delivery O2 Flow Rate FiO2


 


3/3/18 21:14 98.3 80 20  98 Room Air  








Lab Results





 Laboratory Tests








Test


  3/3/18


20:57


 


White Blood Count


  10.2 x10^3/uL


(4.0-11.0)


 


Red Blood Count


  4.72 x10^6/uL


(3.50-5.40)


 


Hemoglobin


  12.8 g/dL


(12.0-15.5)


 


Hematocrit


  38.0 %


(36.0-47.0)


 


Mean Corpuscular Volume


  81 fL ()


 


 


Mean Corpuscular Hemoglobin 27 pg (25-35)  


 


Mean Corpuscular Hemoglobin


Concent 34 g/dL


(31-37)


 


Red Cell Distribution Width


  14.3 %


(11.5-14.5)


 


Platelet Count


  219 x10^3/uL


(140-400)


 


Neutrophils (%) (Auto) 54 % (31-73)  


 


Lymphocytes (%) (Auto) 31 % (24-48)  


 


Monocytes (%) (Auto) 11 % (0-9)  H


 


Eosinophils (%) (Auto) 3 % (0-3)  


 


Basophils (%) (Auto) 1 % (0-3)  


 


Neutrophils # (Auto)


  5.5 x10^3uL


(1.8-7.7)


 


Lymphocytes # (Auto)


  3.2 x10^3/uL


(1.0-4.8)


 


Monocytes # (Auto)


  1.1 x10^3/uL


(0.0-1.1)


 


Eosinophils # (Auto)


  0.3 x10^3/uL


(0.0-0.7)


 


Basophils # (Auto)


  0.1 x10^3/uL


(0.0-0.2)


 


Sodium Level


  136 mmol/L


(136-145)


 


Potassium Level


  3.5 mmol/L


(3.5-5.1)


 


Chloride Level


  102 mmol/L


()


 


Carbon Dioxide Level


  24 mmol/L


(21-32)


 


Anion Gap 10 (6-14)  


 


Blood Urea Nitrogen


  9 mg/dL (7-20)


 


 


Creatinine


  0.7 mg/dL


(0.6-1.0)


 


Estimated GFR


(Cockcroft-Gault) 98.9  


 


 


Glucose Level


  81 mg/dL


(70-99)


 


Calcium Level


  8.6 mg/dL


(8.5-10.1)


 


Troponin I Quantitative


  < 0.017 ng/mL


(0-0.055)











EKG


EKG


Normal sinus rhythm, mild J-point elevation noted throughout.





Radiology/Procedures


Radiology/Procedures


Imaging was declined





Course & Med Decision Making


Course & Med Decision Making


Pertinent Labs and Imaging studies reviewed. (See chart for details)





[]





Dragon Disclaimer


Dragon Disclaimer


This electronic medical record was generated, in whole or in part, using a 

voice recognition dictation system.





Departure


Departure:


Impression:  


 Primary Impression:  


 Chest pain, non-cardiac


Disposition:  01 HOME, SELF-CARE


Condition:  STABLE


Referrals:  


JENNI MARINO MD (PCP)


Patient Instructions:  Costochondritis





Additional Instructions:  


Marbin was seen in the emergency department for chest pain. No emergency 

medical condition was found on history or physical exam. She did have a normal 

EKG and labs. Her pain was suspicious for rib joint pain or costochondritis 

versus anxiety. She was given a prescription for Voltaren gel and advised to 

follow-up with her primary care doctor as needed for further management. She 

was also advised to return to the emergency room if she develops new or 

worsening symptoms.


Scripts


Diclofenac Sodium (VOLTAREN) 100 Gm Gel..gram.


1 GM TP QID, #100 GM 2 Refills


   Prov: KARLY RUSSELL MD         3/3/18











KARLY RUSSELL MD Mar 3, 2018 22:09

## 2018-03-04 NOTE — EKG
[facility]

                 [address] [place] [id number]

Test Date:    2018               Test Time:    21:34:28

Pat Name:     FRANCESCA MCBRIDE           Department:   

Patient ID:   [id number]           Room:          

Gender:       F                        Technician:   ANNABELLA

:          1990               Requested By: KARLY RUSSELL

Order Number: [id number]            Reading MD:     

                                 Measurements

Intervals                              Axis          

Rate:         68                       P:            67

KY:           158                      QRS:          57

QRSD:         78                       T:            50

QT:           386                                    

QTc:          411                                    

                           Interpretive Statements

SINUS RHYTHM

QRS(T) CONTOUR ABNORMALITY

CONSIDER ANTEROSEPTAL MYOCARDIAL DAMAGE

POSSIBLY ABNORMAL ECG

RI6.01

No previous ECG available for comparison

## 2018-03-09 ENCOUNTER — HOSPITAL ENCOUNTER (EMERGENCY)
Dept: HOSPITAL 63 - ER | Age: 28
Discharge: HOME | End: 2018-03-09
Payer: COMMERCIAL

## 2018-03-09 VITALS — DIASTOLIC BLOOD PRESSURE: 70 MMHG | SYSTOLIC BLOOD PRESSURE: 118 MMHG

## 2018-03-09 VITALS — BODY MASS INDEX: 23.22 KG/M2 | WEIGHT: 153.22 LBS | HEIGHT: 68 IN

## 2018-03-09 DIAGNOSIS — Z88.1: ICD-10-CM

## 2018-03-09 DIAGNOSIS — K52.89: Primary | ICD-10-CM

## 2018-03-09 DIAGNOSIS — F41.9: ICD-10-CM

## 2018-03-09 DIAGNOSIS — J45.909: ICD-10-CM

## 2018-03-09 DIAGNOSIS — Z91.010: ICD-10-CM

## 2018-03-09 DIAGNOSIS — Z90.710: ICD-10-CM

## 2018-03-09 DIAGNOSIS — Z91.013: ICD-10-CM

## 2018-03-09 DIAGNOSIS — K21.9: ICD-10-CM

## 2018-03-09 DIAGNOSIS — K50.90: ICD-10-CM

## 2018-03-09 LAB
ALBUMIN SERPL-MCNC: 3.7 G/DL (ref 3.4–5)
ALBUMIN/GLOB SERPL: 0.9 {RATIO} (ref 1–1.7)
ALP SERPL-CCNC: 61 U/L (ref 46–116)
ALT SERPL-CCNC: 14 U/L (ref 14–59)
ANION GAP SERPL CALC-SCNC: 7 MMOL/L (ref 6–14)
APTT PPP: YELLOW S
AST SERPL-CCNC: 18 U/L (ref 15–37)
BACTERIA #/AREA URNS HPF: (no result) /HPF
BASOPHILS # BLD AUTO: 0 X10^3/UL (ref 0–0.2)
BASOPHILS NFR BLD: 1 % (ref 0–3)
BILIRUB SERPL-MCNC: 0.6 MG/DL (ref 0.2–1)
BILIRUB UR QL STRIP: (no result)
BUN/CREAT SERPL: 16 (ref 6–20)
CA-I SERPL ISE-MCNC: 8 MG/DL (ref 7–20)
CALCIUM SERPL-MCNC: 8.6 MG/DL (ref 8.5–10.1)
CHLORIDE SERPL-SCNC: 104 MMOL/L (ref 98–107)
CO2 SERPL-SCNC: 27 MMOL/L (ref 21–32)
CREAT SERPL-MCNC: 0.5 MG/DL (ref 0.6–1)
EOSINOPHIL NFR BLD: 0.2 X10^3/UL (ref 0–0.7)
EOSINOPHIL NFR BLD: 2 % (ref 0–3)
ERYTHROCYTE [DISTWIDTH] IN BLOOD BY AUTOMATED COUNT: 14.8 % (ref 11.5–14.5)
FIBRINOGEN PPP-MCNC: CLEAR MG/DL
GFR SERPLBLD BASED ON 1.73 SQ M-ARVRAT: 145.9 ML/MIN
GLOBULIN SER-MCNC: 3.9 G/DL (ref 2.2–3.8)
GLUCOSE SERPL-MCNC: 79 MG/DL (ref 70–99)
GLUCOSE UR STRIP-MCNC: (no result) MG/DL
HCT VFR BLD CALC: 37.9 % (ref 36–47)
HGB BLD-MCNC: 12.8 G/DL (ref 12–15.5)
LIPASE: 95 U/L (ref 73–393)
LYMPHOCYTES # BLD: 0.9 X10^3/UL (ref 1–4.8)
LYMPHOCYTES NFR BLD AUTO: 13 % (ref 24–48)
MCH RBC QN AUTO: 27 PG (ref 25–35)
MCHC RBC AUTO-ENTMCNC: 34 G/DL (ref 31–37)
MCV RBC AUTO: 81 FL (ref 79–100)
MONO #: 0.6 X10^3/UL (ref 0–1.1)
MONOCYTES NFR BLD: 9 % (ref 0–9)
NEUT #: 5.3 X10^3UL (ref 1.8–7.7)
NEUTROPHILS NFR BLD AUTO: 75 % (ref 31–73)
NITRITE UR QL STRIP: (no result)
PLATELET # BLD AUTO: 173 X10^3/UL (ref 140–400)
POTASSIUM SERPL-SCNC: 3.7 MMOL/L (ref 3.5–5.1)
PROT SERPL-MCNC: 7.6 G/DL (ref 6.4–8.2)
RBC # BLD AUTO: 4.68 X10^6/UL (ref 3.5–5.4)
RBC #/AREA URNS HPF: (no result) /HPF (ref 0–2)
SODIUM SERPL-SCNC: 138 MMOL/L (ref 136–145)
SP GR UR STRIP: 1.02
SQUAMOUS #/AREA URNS LPF: (no result) /LPF
UROBILINOGEN UR-MCNC: 0.2 MG/DL
WBC # BLD AUTO: 7.1 X10^3/UL (ref 4–11)
WBC #/AREA URNS HPF: (no result) /HPF (ref 0–4)

## 2018-03-09 PROCEDURE — 85025 COMPLETE CBC W/AUTO DIFF WBC: CPT

## 2018-03-09 PROCEDURE — 96375 TX/PRO/DX INJ NEW DRUG ADDON: CPT

## 2018-03-09 PROCEDURE — 80053 COMPREHEN METABOLIC PANEL: CPT

## 2018-03-09 PROCEDURE — 96374 THER/PROPH/DIAG INJ IV PUSH: CPT

## 2018-03-09 PROCEDURE — 36415 COLL VENOUS BLD VENIPUNCTURE: CPT

## 2018-03-09 PROCEDURE — 81001 URINALYSIS AUTO W/SCOPE: CPT

## 2018-03-09 PROCEDURE — 99284 EMERGENCY DEPT VISIT MOD MDM: CPT

## 2018-03-09 PROCEDURE — 96361 HYDRATE IV INFUSION ADD-ON: CPT

## 2018-03-09 PROCEDURE — 83690 ASSAY OF LIPASE: CPT

## 2018-03-09 NOTE — PHYS DOC
Past History


Past Medical History:  Anxiety, Asthma, GERD, Other


Additional Past Medical Histor:  Crohn's disease


Past Surgical History:  Hysterectomy


Additional Past Surgical Histo:  colonoscopy, expiratory laparotomy


Smoking:  Non-smoker


Alcohol Use:  None


Drug Use:  None





Adult General


Chief Complaint


Chief Complaint:  ABDOMINAL PAIN





[facility]


27-year-old female patient with with long time history of Crohn's disease 

without taking medication complaining of 8 episodes of nonbloody diarrhea and 4 

episodes of vomiting since this morning with upper and lower abdominal cramping 

pain without radiation. Patient states he is not sure if he had fever and 

denies urinary symptom. Patient had sick contacts at home. Patient rated her 

pain 10/10. She has history of hysterectomy 1 month ago.





Review of Systems


Review of Systems





Constitutional: Denies fever or chills []


Eyes: Denies change in visual acuity, redness, or eye pain []


HENT: Denies nasal congestion or sore throat []


Respiratory: Denies cough or shortness of breath []


Cardiovascular: No additional information not addressed in HPI []


GI: Reports abdominal pain, nausea, vomiting,  diarrhea []


: Denies dysuria or hematuria []


Musculoskeletal: Denies back pain or joint pain []


Integument: Denies rash or skin lesions []


Neurologic: Denies headache, focal weakness or sensory changes []


Endocrine: Denies polyuria or polydipsia []





All other systems were reviewed and found to be within normal limits, except as 

documented in this note.





Current Medications


Current Medications





Current Medications








 Medications


  (Trade)  Dose


 Ordered  Sig/Sandy  Start Time


 Stop Time Status Last Admin


Dose Admin


 


 Ketorolac


 Tromethamine


  (Toradol)  30 mg  1X  ONCE  3/9/18 12:00


 3/9/18 12:07 DC 3/9/18 12:12


30 MG


 


 Ondansetron HCl


  (Zofran)  4 mg  1X  ONCE  3/9/18 12:00


 3/9/18 12:05 DC 3/9/18 12:13


4 MG


 


 Sodium Chloride


  (Normal Saline


 Flush)  10 ml  QSHIFT  PRN  3/9/18 12:00


     


 











Allergies


Allergies





Allergies








Coded Allergies Type Severity Reaction Last Updated Verified


 


  amoxicillin Allergy Unknown  2/22/18 Yes


 


  peanut Allergy Unknown  2/22/18 Yes


 


  shrimp Allergy Unknown  2/22/18 Yes











Physical Exam


Physical Exam





Constitutional: Well developed, well nourished, mild distress, non-toxic 

appearance. []


HENT: Normocephalic, atraumatic, oropharynx moist, no oral exudates, nose 

normal. []


Eyes: PERRLA, EOMI, conjunctiva normal, no discharge. [] 


Neck: Normal range of motion, no tenderness, supple, no stridor. [] 


Cardiovascular:Heart rate regular rhythm, no murmur []


Lungs & Thorax:  Bilateral breath sounds clear to auscultation []


Abdomen: Bowel sounds normal, soft, no tenderness, no masses, no pulsatile 

masses. [] 


Skin: Warm, dry, no erythema, no rash. [] 


Back: No tenderness, no CVA tenderness. [] 


Extremities: No tenderness, no cyanosis, no clubbing, ROM intact, no edema. [] 


Neurologic: Alert and oriented X 3, normal motor function, normal sensory 

function, no focal deficits noted. []


Psychologic: Affect normal, judgement normal, mood normal. []





Current Patient Data


Vital Signs





 Vital Signs








  Date Time  Temp Pulse Resp B/P (MAP) Pulse Ox O2 Delivery O2 Flow Rate FiO2


 


3/9/18 11:48 98.3 98 18  98 Room Air  











EKG


EKG


[]





Radiology/Procedures


Radiology/Procedures


[]





Course & Med Decision Making


Course & Med Decision Making


Pertinent Labs  reviewed. (See chart for details)


Evolution of patient in ER showed 27-year-old female patient with complaining 

of nausea and vomiting and diarrhea since this morning after she sick contacts 

at home. Patient thought she had Crohn's exacerbation. Labs was unremarkable 

without leukocytosis or electrolyte problem or dehydration. Patient treated 

with IV fluid and Zofran and Toradol nut patient stated her pain did not change 

even she looked comfortable. Plan to give Ultram and discharge patient home 

with diagnose acute gastroenteritis.





[1314: Patient refused to take Ultram in ER  and doesn't want to have 

prescription of pain medication or Zofran, and states she has Zofran at home. 

Patient instructed  to continue liquids diet.





Dragon Disclaimer


Dragon Disclaimer


This electronic medical record was generated, in whole or in part, using a 

voice recognition dictation system.





Departure


Departure:


Impression:  


 Primary Impression:  


 Acute gastroenteritis


 Additional Impressions:  


 Abdominal pain


 History of Crohn's disease


Disposition:  01 HOME, SELF-CARE (At 1315)


Condition:  IMPROVED


Referrals:  


JENNI MARINO MD (PCP)


Patient Instructions:  Viral Gastroenteritis





Additional Instructions:  


Drink plenty of liquids


Follow-up with your primary care physician in 3-5 days


Return to ER if not getting better


Scripts


Tramadol Hcl (ULTRAM) 50 Mg Tablet


50 MG PO PRN Q6HRS Y for PAIN, #14 TAB


   Prov: MAGDA BRIGGS MD         3/9/18 


Ondansetron (ZOFRAN ODT) 4 Mg Tab.rapdis


1 TAB SL Q8HRS, #15 TAB


   Prov: MAGDA BRIGGS MD         3/9/18





Problem Qualifiers











MAGDA BRIGGS MD Mar 9, 2018 12:25

## 2020-06-22 ENCOUNTER — HOSPITAL ENCOUNTER (EMERGENCY)
Dept: HOSPITAL 63 - ER | Age: 30
LOS: 1 days | Discharge: HOME | End: 2020-06-23
Payer: OTHER GOVERNMENT

## 2020-06-22 VITALS — BODY MASS INDEX: 20.76 KG/M2 | HEIGHT: 67 IN | WEIGHT: 132.28 LBS

## 2020-06-22 DIAGNOSIS — K21.9: ICD-10-CM

## 2020-06-22 DIAGNOSIS — J45.909: ICD-10-CM

## 2020-06-22 DIAGNOSIS — E87.6: ICD-10-CM

## 2020-06-22 DIAGNOSIS — Z91.010: ICD-10-CM

## 2020-06-22 DIAGNOSIS — E83.42: Primary | ICD-10-CM

## 2020-06-22 DIAGNOSIS — F41.9: ICD-10-CM

## 2020-06-22 DIAGNOSIS — Z91.013: ICD-10-CM

## 2020-06-22 DIAGNOSIS — Z88.1: ICD-10-CM

## 2020-06-22 DIAGNOSIS — K50.90: ICD-10-CM

## 2020-06-22 LAB
ALBUMIN SERPL-MCNC: 3.8 G/DL (ref 3.4–5)
ALBUMIN/GLOB SERPL: 1 {RATIO} (ref 1–1.7)
ALP SERPL-CCNC: 55 U/L (ref 46–116)
ALT SERPL-CCNC: 22 U/L (ref 14–59)
ANION GAP SERPL CALC-SCNC: 7 MMOL/L (ref 6–14)
APTT PPP: YELLOW S
AST SERPL-CCNC: 20 U/L (ref 15–37)
BACTERIA #/AREA URNS HPF: 0 /HPF
BASOPHILS # BLD AUTO: 0.1 X10^3/UL (ref 0–0.2)
BASOPHILS NFR BLD: 1 % (ref 0–3)
BILIRUB SERPL-MCNC: 0.4 MG/DL (ref 0.2–1)
BILIRUB UR QL STRIP: (no result)
BUN/CREAT SERPL: 7 (ref 6–20)
CA-I SERPL ISE-MCNC: 5 MG/DL (ref 7–20)
CALCIUM SERPL-MCNC: 8.6 MG/DL (ref 8.5–10.1)
CHLORIDE SERPL-SCNC: 104 MMOL/L (ref 98–107)
CO2 SERPL-SCNC: 28 MMOL/L (ref 21–32)
CREAT SERPL-MCNC: 0.7 MG/DL (ref 0.6–1)
EOSINOPHIL NFR BLD: 0.1 X10^3/UL (ref 0–0.7)
EOSINOPHIL NFR BLD: 1 % (ref 0–3)
ERYTHROCYTE [DISTWIDTH] IN BLOOD BY AUTOMATED COUNT: 13.3 % (ref 11.5–14.5)
FIBRINOGEN PPP-MCNC: CLEAR MG/DL
GFR SERPLBLD BASED ON 1.73 SQ M-ARVRAT: 118.1 ML/MIN
GLOBULIN SER-MCNC: 3.7 G/DL (ref 2.2–3.8)
GLUCOSE SERPL-MCNC: 83 MG/DL (ref 70–99)
GLUCOSE UR STRIP-MCNC: (no result) MG/DL
HCT VFR BLD CALC: 40.4 % (ref 36–47)
HGB BLD-MCNC: 13.3 G/DL (ref 12–15.5)
LIPASE: 54 U/L (ref 73–393)
LYMPHOCYTES # BLD: 2.1 X10^3/UL (ref 1–4.8)
LYMPHOCYTES NFR BLD AUTO: 28 % (ref 24–48)
MAGNESIUM SERPL-MCNC: 1.6 MG/DL (ref 1.8–2.4)
MCH RBC QN AUTO: 28 PG (ref 25–35)
MCHC RBC AUTO-ENTMCNC: 33 G/DL (ref 31–37)
MCV RBC AUTO: 84 FL (ref 79–100)
MONO #: 0.6 X10^3/UL (ref 0–1.1)
MONOCYTES NFR BLD: 8 % (ref 0–9)
NEUT #: 4.6 X10^3UL (ref 1.8–7.7)
NEUTROPHILS NFR BLD AUTO: 62 % (ref 31–73)
NITRITE UR QL STRIP: (no result)
PLATELET # BLD AUTO: 183 X10^3/UL (ref 140–400)
POTASSIUM SERPL-SCNC: 3.1 MMOL/L (ref 3.5–5.1)
PROT SERPL-MCNC: 7.5 G/DL (ref 6.4–8.2)
RBC # BLD AUTO: 4.79 X10^6/UL (ref 3.5–5.4)
RBC #/AREA URNS HPF: 0 /HPF (ref 0–2)
SODIUM SERPL-SCNC: 139 MMOL/L (ref 136–145)
SP GR UR STRIP: 1.01
SQUAMOUS #/AREA URNS LPF: (no result) /LPF
UROBILINOGEN UR-MCNC: 0.2 MG/DL
WBC # BLD AUTO: 7.4 X10^3/UL (ref 4–11)
WBC #/AREA URNS HPF: (no result) /HPF (ref 0–4)

## 2020-06-22 PROCEDURE — 80053 COMPREHEN METABOLIC PANEL: CPT

## 2020-06-22 PROCEDURE — 96361 HYDRATE IV INFUSION ADD-ON: CPT

## 2020-06-22 PROCEDURE — 84443 ASSAY THYROID STIM HORMONE: CPT

## 2020-06-22 PROCEDURE — 81001 URINALYSIS AUTO W/SCOPE: CPT

## 2020-06-22 PROCEDURE — 84439 ASSAY OF FREE THYROXINE: CPT

## 2020-06-22 PROCEDURE — 96365 THER/PROPH/DIAG IV INF INIT: CPT

## 2020-06-22 PROCEDURE — 36415 COLL VENOUS BLD VENIPUNCTURE: CPT

## 2020-06-22 PROCEDURE — 83735 ASSAY OF MAGNESIUM: CPT

## 2020-06-22 PROCEDURE — 84436 ASSAY OF TOTAL THYROXINE: CPT

## 2020-06-22 PROCEDURE — 83690 ASSAY OF LIPASE: CPT

## 2020-06-22 PROCEDURE — 85025 COMPLETE CBC W/AUTO DIFF WBC: CPT

## 2020-06-22 PROCEDURE — 99284 EMERGENCY DEPT VISIT MOD MDM: CPT

## 2020-06-22 PROCEDURE — 96375 TX/PRO/DX INJ NEW DRUG ADDON: CPT

## 2020-06-22 PROCEDURE — 96374 THER/PROPH/DIAG INJ IV PUSH: CPT

## 2020-06-23 VITALS — DIASTOLIC BLOOD PRESSURE: 53 MMHG | SYSTOLIC BLOOD PRESSURE: 90 MMHG

## 2020-06-23 LAB
T4 FREE SERPL-MCNC: 1.23 NG/DL (ref 0.76–1.46)
THYROID STIM HORMONE (TSH): 0.02 UIU/ML (ref 0.36–3.74)

## 2020-06-23 NOTE — PHYS DOC
Past History


Past Medical History:  Anxiety, Asthma, GERD, Other


Additional Past Medical Histor:  Crohn's disease


Past Surgical History:  , Hysterectomy


Additional Past Surgical Histo:  colonoscopy, expiratory laparotomy


Smoking:  Non-smoker


Alcohol Use:  Occasionally


Drug Use:  None





General Adult


EDM:


Chief Complaint:  DIZZY/LIGHT HEADED





HPI:


HPI:





Patient is a 29-year-old female who presented to ER today for evaluation of 

generalized weakness, dizziness lack of appetite for about 3 days.  Patient 

feels tingling sensation in her extremities today, feels weaker today so she 

came in for evaluation.  Patient denies headache, she feels nauseous but no 

vomiting.  Patient denies any abdominal pain, no fever, no headache, no cough.  

Patient has history of Crohn's disease, but she denies any diarrhea, no 

abdominal pain.





Review of Systems:


Review of Systems:


Constitutional:  Denies fever or chills 


Eyes:  Denies change in visual acuity 


HENT:  Denies nasal congestion or sore throat 


Respiratory:  Denies cough or shortness of breath 


Cardiovascular:  Denies chest pain or edema 


GI:  Denies abdominal pain, vomiting, bloody stools or diarrhea .  Positive for 

nausea


: Denies dysuria 


Musculoskeletal:  Denies back pain or joint pain 


Integument:  Denies rash 


Neurologic:  Denies headache, focal weakness or sensory changes .  Positive for 

generalized weakness and dizziness.


Endocrine:  Denies polyuria or polydipsia 


Lymphatic:  Denies swollen glands 


Psychiatric:  Denies depression or anxiety





Heart Score:


Risk Factors:


Risk Factors:  DM, Current or recent (<one month) smoker, HTN, HLP, family 

history of CAD, obesity.


Risk Scores:


Score 0 - 3:  2.5% MACE over next 6 weeks - Discharge Home


Score 4 - 6:  20.3% MACE over next 6 weeks - Admit for Clinical Observation


Score 7 - 10:  72.7% MACE over next 6 weeks - Early Invasive Strategies





Current Medications:


Current Meds:





Current Medications








 Medications


  (Trade)  Dose


 Ordered  Sig/Sandy  Start Time


 Stop Time Status Last Admin


Dose Admin


 


 Acetaminophen


  (Tylenol)  500 mg  STK-MED ONCE  20 22:33


 20 22:33 DC  





 


 Ibuprofen


  (Motrin)  600 mg  STK-MED ONCE  20 22:33


 20 22:33 DC  





 


 Magnesium Sulfate  50 ml @ As


 Directed  STK-MED ONCE  20 00:05


 20 00:05 DC  





 


 Metoclopramide HCl


  (Reglan Vial)  10 mg  1X  ONCE  20 23:30


 20 23:31 DC 20 23:13


10 MG


 


 Potassium Chloride


  (Klor-Con)  10 meq  STK-MED ONCE  20 00:05


 20 00:05 DC  





 


 Sodium Chloride  1,000 ml @ 


 1,000 mls/hr  1X  ONCE  20 23:00


 20 23:59 DC 20 23:13


1,000 MLS/HR











Allergies:


Allergies:





Allergies








Coded Allergies Type Severity Reaction Last Updated Verified


 


  amoxicillin Allergy Unknown  18 Yes


 


  peanut Allergy Unknown  18 Yes


 


  shrimp Allergy Unknown  18 Yes











Physical Exam:


PE:





Constitutional: Well developed, well nourished, no acute distress, non-toxic 

appearance. []


HENT: Normocephalic, atraumatic, bilateral external ears normal, oropharynx 

moist, no oral exudates, nose normal. []


Eyes: PERRLA, EOMI, conjunctiva normal, no discharge. [] 


Neck: Normal range of motion, no tenderness, supple, no stridor. [] 


Cardiovascular:Heart rate regular rhythm, no murmur []


Lungs & Thorax:  Bilateral breath sounds clear to auscultation []


Abdomen: Bowel sounds normal, soft, no tenderness, no masses, no pulsatile 

masses. [] 


Skin: Warm, dry, no erythema, no rash. [] 


Back: No tenderness, no CVA tenderness. [] 


Extremities: No tenderness, no cyanosis, no clubbing, ROM intact, no edema. [] 


Neurologic: Alert and oriented X 3, normal motor function, normal sensory 

function, no focal deficits noted. []


Psychologic: Affect normal, judgement normal, mood normal. []





Current Patient Data:


Labs:





                                Laboratory Tests








Test


 20


22:25 20


23:00


 


Urine Collection Type Unknown   


 


Urine Color Yellow   


 


Urine Clarity Clear   


 


Urine pH 7.0   


 


Urine Specific Gravity 1.015   


 


Urine Protein


 Neg


(NEG-TRACE) 





 


Urine Glucose (UA)


 Neg mg/dL


(NEG) 





 


Urine Ketones (Stick)


 Neg mg/dL


(NEG) 





 


Urine Blood Trace (NEG)   


 


Urine Nitrite Neg (NEG)   


 


Urine Bilirubin Neg (NEG)   


 


Urine Urobilinogen Dipstick


 0.2 mg/dL (0.2


mg/dL) 





 


Urine Leukocyte Esterase Neg (NEG)   


 


Urine RBC 0 /HPF (0-2)   


 


Urine WBC


 Rare /HPF


(0-4) 





 


Urine Squamous Epithelial


Cells Occ /LPF  


 





 


Urine Bacteria


 0 /HPF (0-FEW)


 





 


White Blood Count


 


 7.4 x10^3/uL


(4.0-11.0)


 


Red Blood Count


 


 4.79 x10^6/uL


(3.50-5.40)


 


Hemoglobin


 


 13.3 g/dL


(12.0-15.5)


 


Hematocrit


 


 40.4 %


(36.0-47.0)


 


Mean Corpuscular Volume


 


 84 fL ()





 


Mean Corpuscular Hemoglobin  28 pg (25-35)  


 


Mean Corpuscular Hemoglobin


Concent 


 33 g/dL


(31-37)


 


Red Cell Distribution Width


 


 13.3 %


(11.5-14.5)


 


Platelet Count


 


 183 x10^3/uL


(140-400)


 


Neutrophils (%) (Auto)  62 % (31-73)  


 


Lymphocytes (%) (Auto)  28 % (24-48)  


 


Monocytes (%) (Auto)  8 % (0-9)  


 


Eosinophils (%) (Auto)  1 % (0-3)  


 


Basophils (%) (Auto)  1 % (0-3)  


 


Neutrophils # (Auto)


 


 4.6 x10^3uL


(1.8-7.7)


 


Lymphocytes # (Auto)


 


 2.1 x10^3/uL


(1.0-4.8)


 


Monocytes # (Auto)


 


 0.6 x10^3/uL


(0.0-1.1)


 


Eosinophils # (Auto)


 


 0.1 x10^3/uL


(0.0-0.7)


 


Basophils # (Auto)


 


 0.1 x10^3/uL


(0.0-0.2)


 


Sodium Level


 


 139 mmol/L


(136-145)


 


Potassium Level


 


 3.1 mmol/L


(3.5-5.1)  L


 


Chloride Level


 


 104 mmol/L


()


 


Carbon Dioxide Level


 


 28 mmol/L


(21-32)


 


Anion Gap  7 (6-14)  


 


Blood Urea Nitrogen


 


 5 mg/dL (7-20)


L


 


Creatinine


 


 0.7 mg/dL


(0.6-1.0)


 


Estimated GFR


(Cockcroft-Gault) 


 118.1  





 


BUN/Creatinine Ratio  7 (6-20)  


 


Glucose Level


 


 83 mg/dL


(70-99)


 


Calcium Level


 


 8.6 mg/dL


(8.5-10.1)


 


Magnesium Level


 


 1.6 mg/dL


(1.8-2.4)  L


 


Total Bilirubin


 


 0.4 mg/dL


(0.2-1.0)


 


Aspartate Amino Transferase


(AST) 


 20 U/L (15-37)





 


Alanine Aminotransferase (ALT)


 


 22 U/L (14-59)





 


Alkaline Phosphatase


 


 55 U/L


()


 


Total Protein


 


 7.5 g/dL


(6.4-8.2)


 


Albumin


 


 3.8 g/dL


(3.4-5.0)


 


Albumin/Globulin Ratio  1.0 (1.0-1.7)  


 


Lipase


 


 54 U/L


()  L








Vital Signs:





                                   Vital Signs








  Date Time  Temp Pulse Resp B/P (MAP) Pulse Ox O2 Delivery O2 Flow Rate FiO2


 


20 22:22 98.5 77 16 129/75 (93) 99 Room Air  











EKG:


EKG:


[]





Radiology/Procedures:


Radiology/Procedures:


[]





Course & Med Decision Making:


Course & Med Decision Making


Pertinent Labs and Imaging studies reviewed. (See chart for details)





Patient is a 29-year-old female who was evaluated in the ER due to generalized 

weakness dizziness.  Patient was found to have low potassium and low magnesium. 

  Patient was given IV fluid, potassium p.o. and IV magnesium.  Patient feel 

much better.  Patient was discharged home she was instructed to follow-up with 

her family doctor to have her potassium and magnesium level rechecked in a 

couple days.  Patient is amenable to plan of care.





Dragon Disclaimer:


Dragon Disclaimer:


This electronic medical record was generated, in whole or in part, using a voice

 recognition dictation system.





Departure


Departure:


Impression:  


   Primary Impression:  


   Hypomagnesemia


   Additional Impression:  


   Hypokalemia


Disposition:   HOME/RESIDENCE PRIOR TO ADM


Condition:  STABLE


Referrals:  


PCP,NO (PCP)


PLEASE FOLLOW UP WITH YOUR DOCTOR FOR REEVALUATION THIS WEEK


Patient Instructions:  Hypokalemia, Hypomagnesemia





Justification of Admission:


Justification of Admission:


Justification of Admission Dx:  N/A











KARLY ENGLE DO                2020 00:55

## 2020-07-22 ENCOUNTER — HOSPITAL ENCOUNTER (EMERGENCY)
Dept: HOSPITAL 63 - ER | Age: 30
Discharge: HOME | End: 2020-07-22
Payer: COMMERCIAL

## 2020-07-22 VITALS — DIASTOLIC BLOOD PRESSURE: 66 MMHG | SYSTOLIC BLOOD PRESSURE: 107 MMHG

## 2020-07-22 VITALS — WEIGHT: 133.38 LBS | HEIGHT: 67 IN | BODY MASS INDEX: 20.93 KG/M2

## 2020-07-22 DIAGNOSIS — Z91.010: ICD-10-CM

## 2020-07-22 DIAGNOSIS — S20.212A: Primary | ICD-10-CM

## 2020-07-22 DIAGNOSIS — K21.9: ICD-10-CM

## 2020-07-22 DIAGNOSIS — K50.90: ICD-10-CM

## 2020-07-22 DIAGNOSIS — F41.9: ICD-10-CM

## 2020-07-22 DIAGNOSIS — J45.909: ICD-10-CM
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
## 2020-07-22 DIAGNOSIS — Z91.013: ICD-10-CM
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
## 2020-07-22 DIAGNOSIS — Z88.1: ICD-10-CM

## 2020-07-22 PROCEDURE — 99283 EMERGENCY DEPT VISIT LOW MDM: CPT

## 2020-07-22 PROCEDURE — 71101 X-RAY EXAM UNILAT RIBS/CHEST: CPT

## 2020-07-22 NOTE — PHYS DOC
Past History


Past Medical History:  Anxiety, Asthma, GERD, Other


Additional Past Medical Histor:  Crohn's disease


Past Surgical History:  , Hysterectomy


Additional Past Surgical Histo:  colonoscopy, expiratory laparotomy


Smoking:  Non-smoker


Alcohol Use:  Occasionally


Drug Use:  None





General Adult


EDM:


Chief Complaint:  RIB PAIN





HPI:


HPI:


29-year-old female with history of Crohn's disease, not currently on medication,

who presents for evaluation of left chest wall injury about 5 days ago.  [place] 

[place]

missed and contused her left chest wall.  She has gradually developed 

increasingly worse pain, worsened with deep inhalation.  No dyspnea per se.  No 

URI symptoms or fever.





Review of Systems:


Review of Systems:


Gen: No fever, chills. 


CV: No syncope.  Reports left chest wall pain.


Resp. No SOB, cough.


GI: No abd pain, N/V.


Neuro: No HA, dizziness


MSK: No  arthralgia, back pain.  Reports myalgia.


Skin: No acute rash or lesion. 


Remainder of systems reviewed and negative unless otherwise specified.





Heart Score:


Risk Factors:


Risk Factors:  DM, Current or recent (<one month) smoker, HTN, HLP, family 

history of CAD, obesity.


Risk Scores:


Score 0 - 3:  2.5% MACE over next 6 weeks - Discharge Home


Score 4 - 6:  20.3% MACE over next 6 weeks - Admit for Clinical Observation


Score 7 - 10:  72.7% MACE over next 6 weeks - Early Invasive Strategies





Allergies:


Allergies:





Allergies








Coded Allergies Type Severity Reaction Last Updated Verified


 


  amoxicillin Allergy Unknown  18 Yes


 


  peanut Allergy Unknown  18 Yes


 


  shrimp Allergy Unknown  18 Yes











Physical Exam:


PE:


Gen: NAD. Well nourished. 


Head: NC/AT. 


Eyes: No scleral icterus. No conjunctival injection. PERRL.


ENT: MMM. Posterior OP clear. No epistaxis or septal hematoma. 


Neck: Supple. NT. 


CV: RRR.  Peripheral pulses intact. 


Resp: CTAB.


Chest: Left anterolateral chest wall TTP. Symmetric chest rise. 


Abd: Soft. NT. ND. 


MSK: No peripheral cyanosis. No edema. Extremities atraumatic x4. 


Back: No midline spinal TTP or stepoffs. 


Neuro: Awake and alert.


Skin. Warm. Dry.  


Psych: Appropriate mood & affect.





EKG:


EKG:


[]





Radiology/Procedures:


Radiology/Procedures:


PROCEDURE: RIBS LEFT AND PA CHEST





Examination: RIBS LEFT AND PA CHEST


 


History: left chest wall injury /pain


 


Comparison/Correlation: None


 


Findings: Frontal view of the chest and 3 images of the left ribs were 


provided.


 


Heart size and pulmonary vasculature are normal. No infiltrate, pleural 


effusion, or pneumothorax. Left ribs are intact with no fracture. Bony 


structures are overall unremarkable.


 


 


Impression:


No infiltrate.


 


No left rib fracture.


 


Electronically signed by: Bakari Menendez MD (2020 3:48 PM) [facility]





Course & Med Decision Making:


Course & Med Decision Making


Pertinent Labs and Imaging studies reviewed. (See chart for details)





In summary, 29F p/w closed left chest wall injury 5 days ago, worsening pain in 

last few days. No SOA. No tachy/hypoxia. Mild nonfocal left chest wall TTP, no c

repitus, with symmetric chest rise. Rib/CXR neg for infiltrate/Fx. Considered 

but do not suspect PE or splenic injury at this time. Given flexeril/lido patch.

 Remains well appearing and nontoxic. Likely chest contusion. Will Rx 

flexeril/lido. Return precautions given.





Dragon Disclaimer:


Dragon Disclaimer:


This electronic medical record was generated, in whole or in part, using a voice

 recognition dictation system.





Departure


Departure:


Impression:  


   Primary Impression:  


   Contusion of left chest wall


Disposition:   HOME/RESIDENCE PRIOR TO ADM


Condition:  STABLE


Referrals:  


PCP,NO (PCP)


Patient Instructions:  Chest Contusion, Easy-to-Read





Additional Instructions:  


Take ibuprofen 600 mg every 8 hours, in addition to the prescribed medications.


Scripts


Lidocaine (Lidocaine PATCH **) 1 Each Adh..patch


1 EACH TP DAILY for FOR LOCAL PAIN, #10 PATCH


   REMOVE AFTER 12 HOURS


   Prov: CLAY CANDELARIA H DO         20 


Cyclobenzaprine Hcl (CYCLOBENZAPRINE HCL) 10 Mg Tablet


1 TAB PO TID for muscle strain, #30 TAB


   Prov: TEAGANCLAY H DO         20





Justification of Admission:


Justification of Admission:


Justification of Admission Dx:  N/A











CLAY CANDELARIA DO                    2020 15:35

## 2020-07-22 NOTE — RAD
Examination: RIBS LEFT AND PA CHEST

 

History: left chest wall injury /pain

 

Comparison/Correlation: None

 

Findings: Frontal view of the chest and 3 images of the left ribs were 

provided.

 

Heart size and pulmonary vasculature are normal. No infiltrate, pleural 

effusion, or pneumothorax. Left ribs are intact with no fracture. Bony 

structures are overall unremarkable.

 

 

Impression:

No infiltrate.

 

No left rib fracture.

 

Electronically signed by: Bakari Menendez MD (7/22/2020 3:48 PM) [facility]